# Patient Record
Sex: MALE | Race: WHITE | NOT HISPANIC OR LATINO
[De-identification: names, ages, dates, MRNs, and addresses within clinical notes are randomized per-mention and may not be internally consistent; named-entity substitution may affect disease eponyms.]

---

## 2017-02-01 ENCOUNTER — APPOINTMENT (OUTPATIENT)
Dept: CARDIOLOGY | Facility: CLINIC | Age: 67
End: 2017-02-01

## 2017-02-01 VITALS — SYSTOLIC BLOOD PRESSURE: 146 MMHG | DIASTOLIC BLOOD PRESSURE: 70 MMHG | HEART RATE: 61 BPM

## 2017-02-01 VITALS — WEIGHT: 232 LBS | HEIGHT: 78 IN | BODY MASS INDEX: 26.84 KG/M2

## 2017-06-20 ENCOUNTER — APPOINTMENT (OUTPATIENT)
Dept: CARDIOLOGY | Facility: CLINIC | Age: 67
End: 2017-06-20

## 2017-06-20 VITALS — BODY MASS INDEX: 26.84 KG/M2 | WEIGHT: 232 LBS | HEIGHT: 78 IN

## 2017-06-20 VITALS — DIASTOLIC BLOOD PRESSURE: 80 MMHG | SYSTOLIC BLOOD PRESSURE: 124 MMHG | HEART RATE: 83 BPM

## 2017-07-22 ENCOUNTER — APPOINTMENT (OUTPATIENT)
Dept: CARDIOLOGY | Facility: CLINIC | Age: 67
End: 2017-07-22

## 2017-08-15 ENCOUNTER — MEDICATION RENEWAL (OUTPATIENT)
Age: 67
End: 2017-08-15

## 2017-08-31 ENCOUNTER — TRANSCRIPTION ENCOUNTER (OUTPATIENT)
Age: 67
End: 2017-08-31

## 2017-10-03 ENCOUNTER — TRANSCRIPTION ENCOUNTER (OUTPATIENT)
Age: 67
End: 2017-10-03

## 2017-11-09 ENCOUNTER — APPOINTMENT (OUTPATIENT)
Dept: CARDIOLOGY | Facility: CLINIC | Age: 67
End: 2017-11-09

## 2017-11-09 VITALS — WEIGHT: 248 LBS | HEIGHT: 71 IN | BODY MASS INDEX: 34.72 KG/M2

## 2017-11-09 VITALS — SYSTOLIC BLOOD PRESSURE: 128 MMHG | DIASTOLIC BLOOD PRESSURE: 80 MMHG | HEART RATE: 79 BPM

## 2017-11-20 ENCOUNTER — APPOINTMENT (OUTPATIENT)
Dept: CARDIOLOGY | Facility: CLINIC | Age: 67
End: 2017-11-20

## 2018-01-29 ENCOUNTER — RX RENEWAL (OUTPATIENT)
Age: 68
End: 2018-01-29

## 2018-02-23 ENCOUNTER — RX RENEWAL (OUTPATIENT)
Age: 68
End: 2018-02-23

## 2018-03-20 ENCOUNTER — APPOINTMENT (OUTPATIENT)
Dept: CARDIOLOGY | Facility: CLINIC | Age: 68
End: 2018-03-20

## 2018-03-20 VITALS
HEIGHT: 71 IN | DIASTOLIC BLOOD PRESSURE: 56 MMHG | SYSTOLIC BLOOD PRESSURE: 130 MMHG | HEART RATE: 63 BPM | BODY MASS INDEX: 31.78 KG/M2 | WEIGHT: 227 LBS

## 2018-08-02 ENCOUNTER — APPOINTMENT (OUTPATIENT)
Dept: CARDIOLOGY | Facility: CLINIC | Age: 68
End: 2018-08-02

## 2018-08-02 VITALS
SYSTOLIC BLOOD PRESSURE: 126 MMHG | DIASTOLIC BLOOD PRESSURE: 80 MMHG | BODY MASS INDEX: 32.48 KG/M2 | HEART RATE: 59 BPM | WEIGHT: 232 LBS | HEIGHT: 71 IN

## 2018-08-29 ENCOUNTER — RX RENEWAL (OUTPATIENT)
Age: 68
End: 2018-08-29

## 2018-10-31 ENCOUNTER — RX RENEWAL (OUTPATIENT)
Age: 68
End: 2018-10-31

## 2018-11-27 ENCOUNTER — APPOINTMENT (OUTPATIENT)
Dept: CARDIOLOGY | Facility: CLINIC | Age: 68
End: 2018-11-27

## 2019-05-09 ENCOUNTER — APPOINTMENT (OUTPATIENT)
Dept: CARDIOLOGY | Facility: CLINIC | Age: 69
End: 2019-05-09
Payer: MEDICARE

## 2019-05-09 VITALS
BODY MASS INDEX: 34.44 KG/M2 | DIASTOLIC BLOOD PRESSURE: 86 MMHG | HEART RATE: 62 BPM | WEIGHT: 246 LBS | SYSTOLIC BLOOD PRESSURE: 174 MMHG | HEIGHT: 71 IN

## 2019-05-09 VITALS — SYSTOLIC BLOOD PRESSURE: 156 MMHG | DIASTOLIC BLOOD PRESSURE: 80 MMHG

## 2019-05-09 PROCEDURE — 93000 ELECTROCARDIOGRAM COMPLETE: CPT

## 2019-05-09 PROCEDURE — 99214 OFFICE O/P EST MOD 30 MIN: CPT

## 2019-05-09 NOTE — PHYSICAL EXAM
[General Appearance - Well Developed] : well developed [Normal Appearance] : normal appearance [Well Groomed] : well groomed [General Appearance - Well Nourished] : well nourished [Normal Conjunctiva] : the conjunctiva exhibited no abnormalities [No Deformities] : no deformities [Normal Oral Mucosa] : normal oral mucosa [Normal Oropharynx] : normal oropharynx [FreeTextEntry1] : No JVD [Respiration, Rhythm And Depth] : normal respiratory rhythm and effort [Heart Rate And Rhythm] : heart rate and rhythm were normal [Exaggerated Use Of Accessory Muscles For Inspiration] : no accessory muscle use [Bowel Sounds] : normal bowel sounds [Abdomen Tenderness] : non-tender [Heart Sounds] : normal S1 and S2 [Abdomen Mass (___ Cm)] : no abdominal mass palpated [Abnormal Walk] : normal gait [Nail Clubbing] : no clubbing of the fingernails [Cyanosis, Localized] : no localized cyanosis [Skin Color & Pigmentation] : normal skin color and pigmentation [Skin Turgor] : normal skin turgor [] : no rash [Impaired Insight] : insight and judgment were intact [Affect] : the affect was normal [Oriented To Time, Place, And Person] : oriented to person, place, and time [No Anxiety] : not feeling anxious

## 2019-05-09 NOTE — ASSESSMENT
[FreeTextEntry1] : The patient has continued to gain weight . His BP is increased in office but did decreased when able to relax in the office. The patient has had no chest pain . Home BP readings have beennormal to borderline.

## 2019-08-01 ENCOUNTER — APPOINTMENT (OUTPATIENT)
Dept: CARDIOLOGY | Facility: CLINIC | Age: 69
End: 2019-08-01
Payer: MEDICARE

## 2019-08-01 PROCEDURE — 93306 TTE W/DOPPLER COMPLETE: CPT

## 2019-08-04 ENCOUNTER — TRANSCRIPTION ENCOUNTER (OUTPATIENT)
Age: 69
End: 2019-08-04

## 2019-09-29 ENCOUNTER — RX RENEWAL (OUTPATIENT)
Age: 69
End: 2019-09-29

## 2019-12-08 ENCOUNTER — RX RENEWAL (OUTPATIENT)
Age: 69
End: 2019-12-08

## 2020-01-17 ENCOUNTER — APPOINTMENT (OUTPATIENT)
Dept: CARDIOLOGY | Facility: CLINIC | Age: 70
End: 2020-01-17
Payer: MEDICARE

## 2020-01-17 VITALS — HEART RATE: 80 BPM | BODY MASS INDEX: 36.4 KG/M2 | WEIGHT: 260 LBS | HEIGHT: 71 IN

## 2020-01-17 VITALS — SYSTOLIC BLOOD PRESSURE: 122 MMHG | DIASTOLIC BLOOD PRESSURE: 60 MMHG

## 2020-01-17 DIAGNOSIS — I35.1 NONRHEUMATIC AORTIC (VALVE) INSUFFICIENCY: ICD-10-CM

## 2020-01-17 PROCEDURE — 99214 OFFICE O/P EST MOD 30 MIN: CPT

## 2020-01-17 PROCEDURE — 93000 ELECTROCARDIOGRAM COMPLETE: CPT

## 2020-01-17 NOTE — REASON FOR VISIT
[Follow-Up - Clinic] : a clinic follow-up of [Atrial Fibrillation] : atrial fibrillation [Medication Management] : Medication management [Hypertension] : hypertension

## 2020-01-17 NOTE — ASSESSMENT
[FreeTextEntry1] : The patient has continued to gain weight . His BP is increased in office but did decreased when able to relax in the office. The patient has had no chest pain . Home BP readings have been normal to borderline. The patient conitnues to gain weight . He is now obese . He has a worsening first degree AV block and RBBB . BP is better controlled. His home BP does not correlate when checked in office .

## 2020-01-17 NOTE — PHYSICAL EXAM
[Well Groomed] : well groomed [General Appearance - Well Developed] : well developed [Normal Appearance] : normal appearance [Normal Conjunctiva] : the conjunctiva exhibited no abnormalities [General Appearance - Well Nourished] : well nourished [No Deformities] : no deformities [Normal Oropharynx] : normal oropharynx [Normal Oral Mucosa] : normal oral mucosa [Exaggerated Use Of Accessory Muscles For Inspiration] : no accessory muscle use [Respiration, Rhythm And Depth] : normal respiratory rhythm and effort [Bowel Sounds] : normal bowel sounds [Heart Sounds] : normal S1 and S2 [Heart Rate And Rhythm] : heart rate and rhythm were normal [Abnormal Walk] : normal gait [Abdomen Mass (___ Cm)] : no abdominal mass palpated [Abdomen Tenderness] : non-tender [Cyanosis, Localized] : no localized cyanosis [Nail Clubbing] : no clubbing of the fingernails [Skin Color & Pigmentation] : normal skin color and pigmentation [Oriented To Time, Place, And Person] : oriented to person, place, and time [] : no rash [Impaired Insight] : insight and judgment were intact [Skin Turgor] : normal skin turgor [Affect] : the affect was normal [No Anxiety] : not feeling anxious [FreeTextEntry1] : No JVD

## 2020-01-17 NOTE — HISTORY OF PRESENT ILLNESS
[FreeTextEntry1] : The patient has not had chest pain or shortness of breath or palpitations. No lightheadedness or dizziness. He has a worsening first degree AV block in the face of a RBBB .

## 2020-03-24 ENCOUNTER — RX RENEWAL (OUTPATIENT)
Age: 70
End: 2020-03-24

## 2020-06-05 ENCOUNTER — APPOINTMENT (OUTPATIENT)
Dept: CARDIOLOGY | Facility: CLINIC | Age: 70
End: 2020-06-05
Payer: MEDICARE

## 2020-06-05 ENCOUNTER — APPOINTMENT (OUTPATIENT)
Dept: CARDIOLOGY | Facility: CLINIC | Age: 70
End: 2020-06-05

## 2020-06-05 VITALS — DIASTOLIC BLOOD PRESSURE: 80 MMHG | SYSTOLIC BLOOD PRESSURE: 159 MMHG

## 2020-06-05 PROCEDURE — 99214 OFFICE O/P EST MOD 30 MIN: CPT | Mod: 95

## 2020-06-05 NOTE — PHYSICAL EXAM
[General Appearance - Well Developed] : well developed [Normal Appearance] : normal appearance [Well Groomed] : well groomed [General Appearance - Well Nourished] : well nourished [No Deformities] : no deformities [Normal Conjunctiva] : the conjunctiva exhibited no abnormalities [Normal Oral Mucosa] : normal oral mucosa [Normal Oropharynx] : normal oropharynx [Respiration, Rhythm And Depth] : normal respiratory rhythm and effort [Exaggerated Use Of Accessory Muscles For Inspiration] : no accessory muscle use [Heart Rate And Rhythm] : heart rate and rhythm were normal [Heart Sounds] : normal S1 and S2 [Abdomen Mass (___ Cm)] : no abdominal mass palpated [Abnormal Walk] : normal gait [Nail Clubbing] : no clubbing of the fingernails [Cyanosis, Localized] : no localized cyanosis [Skin Color & Pigmentation] : normal skin color and pigmentation [Skin Turgor] : normal skin turgor [] : no rash [Oriented To Time, Place, And Person] : oriented to person, place, and time [Impaired Insight] : insight and judgment were intact [Affect] : the affect was normal [No Anxiety] : not feeling anxious [FreeTextEntry1] : No JVD

## 2020-06-05 NOTE — HISTORY OF PRESENT ILLNESS
[Home] : at home, [unfilled] , at the time of the visit. [Medical Office: ([facility])___] : at the medical office located in  [Verbal consent obtained from patient] : the patient, [unfilled] [FreeTextEntry1] : The patient has not had lightheadedness or SOb or chest pain . The patient has changed jobs and there is no stress . His BP has been variable . The patient has not seen EP as of yet .

## 2020-06-05 NOTE — ASSESSMENT
[FreeTextEntry1] : The patient has had PAF . BP has been high normal at home . No HA or lightheadedness . The patient has had no CP or SOB . Still awaiting for EP contsult of marked first degrree AV block in the face of RBBB .

## 2020-06-25 ENCOUNTER — APPOINTMENT (OUTPATIENT)
Dept: CARDIOLOGY | Facility: CLINIC | Age: 70
End: 2020-06-25
Payer: MEDICARE

## 2020-06-25 ENCOUNTER — APPOINTMENT (OUTPATIENT)
Dept: CARDIOLOGY | Facility: CLINIC | Age: 70
End: 2020-06-25

## 2020-06-25 VITALS
SYSTOLIC BLOOD PRESSURE: 164 MMHG | WEIGHT: 262 LBS | HEIGHT: 72 IN | TEMPERATURE: 98.2 F | HEART RATE: 67 BPM | BODY MASS INDEX: 35.49 KG/M2 | DIASTOLIC BLOOD PRESSURE: 82 MMHG

## 2020-06-25 PROCEDURE — 99204 OFFICE O/P NEW MOD 45 MIN: CPT

## 2020-06-25 PROCEDURE — 93000 ELECTROCARDIOGRAM COMPLETE: CPT

## 2020-06-25 NOTE — PHYSICAL EXAM
[Normal Appearance] : normal appearance [General Appearance - Well Developed] : well developed [Well Groomed] : well groomed [General Appearance - Well Nourished] : well nourished [Normal Conjunctiva] : the conjunctiva exhibited no abnormalities [No Deformities] : no deformities [Normal Oral Mucosa] : normal oral mucosa [Normal Oropharynx] : normal oropharynx [Heart Sounds] : normal S1 and S2 [Heart Rate And Rhythm] : heart rate and rhythm were normal [Respiration, Rhythm And Depth] : normal respiratory rhythm and effort [Abdomen Mass (___ Cm)] : no abdominal mass palpated [Exaggerated Use Of Accessory Muscles For Inspiration] : no accessory muscle use [Abnormal Walk] : normal gait [Nail Clubbing] : no clubbing of the fingernails [Cyanosis, Localized] : no localized cyanosis [Skin Color & Pigmentation] : normal skin color and pigmentation [Skin Turgor] : normal skin turgor [Impaired Insight] : insight and judgment were intact [] : no rash [Oriented To Time, Place, And Person] : oriented to person, place, and time [Affect] : the affect was normal [No Anxiety] : not feeling anxious [FreeTextEntry1] : No JVD

## 2020-06-25 NOTE — HISTORY OF PRESENT ILLNESS
[Home] : at home, [unfilled] , at the time of the visit. [Medical Office: ([facility])___] : at the medical office located in  [Verbal consent obtained from patient] : the patient, [unfilled] [FreeTextEntry1] : The patient with hsotry of HTN, DM, and 1 dgree AV block with RBBB. Patient denies syncope or lightheadedness. However, patient has mildly asked and significant conduction disease. Patient was referred to further evaluation of his conduction disease. Patient also describes a possibility of having atrial fibrillation the past. However patient is not sure if he did or did not have atrial fibrillation.\par \par \par echo - normal EF, mil AS No

## 2020-06-25 NOTE — ASSESSMENT
[FreeTextEntry1] : COnduction disease\par RBBB with 1st degree\par \par - Recommend loop recorder implanted to further evaluation conduction disease. Loop recorder also be helpful to evaluate if patient truly has atrial fibrillation.\par \par I have explained the procedure to the patient.  I have explained the risks and benefits of the procedure to the patient.  There is approximately 1% chance of any major cardiovascular complication to occur. Complications include, but are not limited to infection or bleeding,  The patient understands the risk and would like to proceed with the procedure. Patient indicated that all of his questions were answered to his satisfaction and verbalized understanding.\par

## 2020-07-24 ENCOUNTER — OUTPATIENT (OUTPATIENT)
Dept: OUTPATIENT SERVICES | Facility: HOSPITAL | Age: 70
LOS: 1 days | Discharge: HOME | End: 2020-07-24

## 2020-07-24 DIAGNOSIS — Z11.59 ENCOUNTER FOR SCREENING FOR OTHER VIRAL DISEASES: ICD-10-CM

## 2020-07-27 ENCOUNTER — OUTPATIENT (OUTPATIENT)
Dept: OUTPATIENT SERVICES | Facility: HOSPITAL | Age: 70
LOS: 1 days | Discharge: HOME | End: 2020-07-27
Payer: MEDICARE

## 2020-07-27 DIAGNOSIS — I45.10 UNSPECIFIED RIGHT BUNDLE-BRANCH BLOCK: ICD-10-CM

## 2020-07-27 PROCEDURE — 33285 INSJ SUBQ CAR RHYTHM MNTR: CPT

## 2020-07-27 RX ORDER — CEPHALEXIN 500 MG
500 CAPSULE ORAL ONCE
Refills: 0 | Status: COMPLETED | OUTPATIENT
Start: 2020-07-27 | End: 2020-07-27

## 2020-07-27 RX ADMIN — Medication 500 MILLIGRAM(S): at 09:05

## 2020-07-27 NOTE — CHART NOTE - NSCHARTNOTEFT_GEN_A_CORE
Cardiac Electrophysiology Procedure Note    Procedure:  [employer]  Implantable Loop Recorder Implant    Indication: AF  Attending:	Rohan Couch MD    EQUIPMENT IMPLANTED      [employer] Linq  Serial Number  [id number]        DESCRIPTION OF PROCEDURE  The patient was brought to the Procedure Room in a nonsedated and fasting state, having received preoperative antibiotics. Informed, written consent was obtained prior to the procedure.  The left shoulder region was cleaned and prepped with serial applications of Chlorhexidine. Patient was then covered with sterile drapes in the usual manner. Blood pressure, oxygenation and level of comfort were stable throughout.   	The left parasternal region was defined; 10 cc of lidocaine solution were infiltrated into the skin overlying the left deltopectoral groove. A 5 mm. incision was then made. The loop recorder was injected under the skin..     The wound margins approximated well, without any tension or overlap. The wound was closed using dermabond  A dry, sterile dressing was placed over this.     COMPLICATIONS:  The patient tolerated the procedure well. There were no immediate complications.    CONCLUSIONS:  Successful implant of loop recorder.

## 2020-07-27 NOTE — PROGRESS NOTE ADULT - SUBJECTIVE AND OBJECTIVE BOX
Electrophysiology Brief Post-Op Note    I have personally seen and examined the patient.  I agree with the history and physical which I have reviewed and noted any changes below.  07-27-20 @ 08:31    PRE-OP DIAGNOSIS: AF    POST-OP DIAGNOSIS: AF    PROCEDURE: Loop Implnat    Physician: Khoa  Assistant: None    ESTIMATED BLOOD LOSS:  1     mL    ANESTHESIA TYPE:  [  ]General Anesthesia  [  ] Sedation  [X  ] Local/Regional    CONDITION  [  ] Critical  [  ] Serious  [  ]Fair  [ X ]Good      SPECIMENS REMOVED (IF APPLICABLE):  none    IMPLANTS (IF APPLICABLE)  Loop    FINDINGS  PLAN OF CARE  - FU 3-4 weeks

## 2020-07-27 NOTE — H&P ADULT - HISTORY OF PRESENT ILLNESS
The patient with history of HTN, DM, and 1 dgree AV block with RBBB. Patient was referred to further evaluation of his conduction disease. Patient describes a possibility of having atrial fibrillation the past. However patient is not sure if he did or did not have atrial fibrillation. Currently denies fever, chills, syncope, palpitations, chest pain, SOB, cough, abd pain, n/v/d/c.

## 2020-07-27 NOTE — H&P ADULT - ASSESSMENT
Cardiologist: Dr. Denney    This is a 69 y/o male with PMH of HTN, DM, and 1 degree AV block with RBBB who presents for ILR to r/o underlying AF.    Plan:  - Keflex 500 mg PO x 1 dose for ppx  - Home after procedure  - F/u in 4-6 weeks for postop check  - May remove bandaid tomorrow  - May shower starting tomorrow

## 2020-07-29 DIAGNOSIS — I10 ESSENTIAL (PRIMARY) HYPERTENSION: ICD-10-CM

## 2020-07-29 DIAGNOSIS — I44.0 ATRIOVENTRICULAR BLOCK, FIRST DEGREE: ICD-10-CM

## 2020-07-29 DIAGNOSIS — E11.9 TYPE 2 DIABETES MELLITUS WITHOUT COMPLICATIONS: ICD-10-CM

## 2020-08-31 ENCOUNTER — APPOINTMENT (OUTPATIENT)
Dept: CARDIOLOGY | Facility: CLINIC | Age: 70
End: 2020-08-31
Payer: MEDICARE

## 2020-08-31 PROCEDURE — G2066: CPT

## 2020-08-31 PROCEDURE — 93298 REM INTERROG DEV EVAL SCRMS: CPT

## 2020-09-15 ENCOUNTER — APPOINTMENT (OUTPATIENT)
Dept: CARDIOLOGY | Facility: CLINIC | Age: 70
End: 2020-09-15
Payer: MEDICARE

## 2020-09-15 VITALS
HEART RATE: 71 BPM | SYSTOLIC BLOOD PRESSURE: 129 MMHG | TEMPERATURE: 97.3 F | DIASTOLIC BLOOD PRESSURE: 73 MMHG | WEIGHT: 260 LBS | HEIGHT: 71 IN | BODY MASS INDEX: 36.4 KG/M2

## 2020-09-15 PROCEDURE — 93291 INTERROG DEV EVAL SCRMS IP: CPT

## 2020-09-15 PROCEDURE — 99213 OFFICE O/P EST LOW 20 MIN: CPT

## 2020-09-15 NOTE — HISTORY OF PRESENT ILLNESS
[FreeTextEntry1] : \par Cardiologist: Dr. Iverson\par \par Post ILR implant on July 27 for long term monitoring of likely  worsening first degree AV block in the face of a RBBB  \par \par Presents for loop interrogation and incision check\par \par Denies any sob, HOBBS, PND, orthopnea, no palpitations, no dizziness,lightheadedness, denies chest pains\par \par ECG ( 9/15/2020) 71bpm 1st degree AVB  ms  RBBB QRS 138ms, QTC 431ms\par ECHO (8/1/2019) EF normal  mild AS mild TR

## 2020-09-15 NOTE — PROCEDURE
[No] : not [de-identified] : [employer] LINQ [de-identified] : LINQ11 [de-identified] : 7/27/2020 [de-identified] : False reading of AF , SR with PAc, reprogrammed aggressive, less sensitive\par  [de-identified] : [id number]

## 2020-09-15 NOTE — REVIEW OF SYSTEMS
[Recent Weight Gain (___ Lbs)] : recent [unfilled] ~Ulb weight gain [Eyeglasses] : currently wearing eyeglasses [Joint Pain] : joint pain [Negative] : Heme/Lymph

## 2020-09-15 NOTE — PHYSICAL EXAM
[General Appearance - Well Developed] : well developed [General Appearance - Well Nourished] : well nourished [Well Groomed] : well groomed [Normal Appearance] : normal appearance [No Deformities] : no deformities [Heart Rate And Rhythm] : heart rate and rhythm were normal [Heart Sounds] : normal S1 and S2 [Respiration, Rhythm And Depth] : normal respiratory rhythm and effort [Exaggerated Use Of Accessory Muscles For Inspiration] : no accessory muscle use [Dry] : dry [Clean] : clean [Well-Healed] : well-healed [Bowel Sounds] : normal bowel sounds [Abdomen Tenderness] : non-tender [Abdomen Mass (___ Cm)] : no abdominal mass palpated [Normal Conjunctiva] : the conjunctiva exhibited no abnormalities [Cyanosis, Localized] : no localized cyanosis [Nail Clubbing] : no clubbing of the fingernails [Normal Oral Mucosa] : normal oral mucosa [Normal Oropharynx] : normal oropharynx [Skin Color & Pigmentation] : normal skin color and pigmentation [Abnormal Walk] : normal gait [] : no rash [Skin Turgor] : normal skin turgor [Oriented To Time, Place, And Person] : oriented to person, place, and time [Affect] : the affect was normal [Impaired Insight] : insight and judgment were intact [No Anxiety] : not feeling anxious [Erythema] : not erythematous [Tender] : not tender [FreeTextEntry1] : No JVD

## 2020-10-03 ENCOUNTER — OUTPATIENT (OUTPATIENT)
Dept: OUTPATIENT SERVICES | Facility: HOSPITAL | Age: 70
LOS: 1 days | Discharge: HOME | End: 2020-10-03

## 2020-10-03 DIAGNOSIS — Z11.59 ENCOUNTER FOR SCREENING FOR OTHER VIRAL DISEASES: ICD-10-CM

## 2020-10-06 ENCOUNTER — OUTPATIENT (OUTPATIENT)
Dept: OUTPATIENT SERVICES | Facility: HOSPITAL | Age: 70
LOS: 1 days | Discharge: HOME | End: 2020-10-06

## 2020-10-07 DIAGNOSIS — G47.33 OBSTRUCTIVE SLEEP APNEA (ADULT) (PEDIATRIC): ICD-10-CM

## 2020-10-19 ENCOUNTER — APPOINTMENT (OUTPATIENT)
Dept: CARDIOLOGY | Facility: CLINIC | Age: 70
End: 2020-10-19
Payer: MEDICARE

## 2020-10-19 PROCEDURE — G2066: CPT

## 2020-10-19 PROCEDURE — 93298 REM INTERROG DEV EVAL SCRMS: CPT

## 2020-11-23 ENCOUNTER — APPOINTMENT (OUTPATIENT)
Dept: CARDIOLOGY | Facility: CLINIC | Age: 70
End: 2020-11-23
Payer: MEDICARE

## 2020-11-23 ENCOUNTER — NON-APPOINTMENT (OUTPATIENT)
Age: 70
End: 2020-11-23

## 2020-11-23 PROCEDURE — G2066: CPT

## 2020-11-23 PROCEDURE — 93298 REM INTERROG DEV EVAL SCRMS: CPT

## 2020-12-28 ENCOUNTER — APPOINTMENT (OUTPATIENT)
Dept: CARDIOLOGY | Facility: CLINIC | Age: 70
End: 2020-12-28
Payer: MEDICARE

## 2020-12-28 PROCEDURE — 93298 REM INTERROG DEV EVAL SCRMS: CPT

## 2020-12-28 PROCEDURE — G2066: CPT

## 2021-01-25 ENCOUNTER — NON-APPOINTMENT (OUTPATIENT)
Age: 71
End: 2021-01-25

## 2021-02-02 ENCOUNTER — APPOINTMENT (OUTPATIENT)
Dept: CARDIOLOGY | Facility: CLINIC | Age: 71
End: 2021-02-02
Payer: MEDICARE

## 2021-02-02 PROCEDURE — 93298 REM INTERROG DEV EVAL SCRMS: CPT

## 2021-02-02 PROCEDURE — G2066: CPT

## 2021-02-16 ENCOUNTER — NON-APPOINTMENT (OUTPATIENT)
Age: 71
End: 2021-02-16

## 2021-03-09 ENCOUNTER — NON-APPOINTMENT (OUTPATIENT)
Age: 71
End: 2021-03-09

## 2021-03-09 ENCOUNTER — APPOINTMENT (OUTPATIENT)
Dept: CARDIOLOGY | Facility: CLINIC | Age: 71
End: 2021-03-09
Payer: MEDICARE

## 2021-03-09 PROCEDURE — G2066: CPT

## 2021-03-09 PROCEDURE — 93298 REM INTERROG DEV EVAL SCRMS: CPT

## 2021-04-13 ENCOUNTER — NON-APPOINTMENT (OUTPATIENT)
Age: 71
End: 2021-04-13

## 2021-04-13 ENCOUNTER — APPOINTMENT (OUTPATIENT)
Dept: CARDIOLOGY | Facility: CLINIC | Age: 71
End: 2021-04-13
Payer: MEDICARE

## 2021-04-13 PROCEDURE — G2066: CPT

## 2021-04-13 PROCEDURE — 93298 REM INTERROG DEV EVAL SCRMS: CPT

## 2021-05-01 ENCOUNTER — RX RENEWAL (OUTPATIENT)
Age: 71
End: 2021-05-01

## 2021-06-02 ENCOUNTER — APPOINTMENT (OUTPATIENT)
Dept: CARDIOLOGY | Facility: CLINIC | Age: 71
End: 2021-06-02
Payer: MEDICARE

## 2021-06-02 VITALS
DIASTOLIC BLOOD PRESSURE: 81 MMHG | HEIGHT: 71 IN | BODY MASS INDEX: 35.14 KG/M2 | TEMPERATURE: 98.2 F | HEART RATE: 67 BPM | RESPIRATION RATE: 16 BRPM | SYSTOLIC BLOOD PRESSURE: 154 MMHG | OXYGEN SATURATION: 96 % | WEIGHT: 251 LBS

## 2021-06-02 PROCEDURE — 93291 INTERROG DEV EVAL SCRMS IP: CPT

## 2021-06-02 PROCEDURE — 93000 ELECTROCARDIOGRAM COMPLETE: CPT | Mod: 59

## 2021-06-02 PROCEDURE — 99213 OFFICE O/P EST LOW 20 MIN: CPT

## 2021-06-02 RX ORDER — LOSARTAN POTASSIUM 100 MG/1
100 TABLET, FILM COATED ORAL DAILY
Qty: 90 | Refills: 3 | Status: DISCONTINUED | COMMUNITY
Start: 2017-02-01 | End: 2021-06-02

## 2021-06-02 NOTE — REASON FOR VISIT
[Follow-up Device Check] : is here today for a follow-up device check visit for [Arrhythmias (seen on stored data)] : arrhythmias seen on stored data [Follow-Up - Clinic] : a clinic follow-up of [Atrial Fibrillation] : atrial fibrillation [Hypertension] : hypertension [Medication Management] : Medication management

## 2021-06-02 NOTE — PHYSICAL EXAM
[General Appearance - Well Developed] : well developed [Normal Appearance] : normal appearance [Well Groomed] : well groomed [General Appearance - Well Nourished] : well nourished [No Deformities] : no deformities [Heart Rate And Rhythm] : heart rate and rhythm were normal [Heart Sounds] : normal S1 and S2 [Respiration, Rhythm And Depth] : normal respiratory rhythm and effort [Exaggerated Use Of Accessory Muscles For Inspiration] : no accessory muscle use [Well-Healed] : well-healed [Abdomen Soft] : soft [Abdomen Mass (___ Cm)] : no abdominal mass palpated [Nail Clubbing] : no clubbing of the fingernails [Cyanosis, Localized] : no localized cyanosis [Normal Conjunctiva] : the conjunctiva exhibited no abnormalities [Abnormal Walk] : normal gait [Skin Color & Pigmentation] : normal skin color and pigmentation [Skin Turgor] : normal skin turgor [] : no rash [Oriented To Time, Place, And Person] : oriented to person, place, and time [Impaired Insight] : insight and judgment were intact [Affect] : the affect was normal [No Anxiety] : not feeling anxious [FreeTextEntry1] : No JVD

## 2021-06-02 NOTE — ASSESSMENT
[FreeTextEntry1] : ILR interrogation\par - Many noctural pippa episodes consistent with history for which he is asymptomatic.\par - Many false AF detections possibly due to lengthened SD interval \par \par ILR AF detection changed from less to least sensitive to reduce false AF detections. Pippa duration changed from 4 to 8 beats to reduce amount of pippa data as this is known to patient's history.\par \par - Patient aware of conduction disease. He reports feeling well. He does report history of sleep apnea but states he does not want to wear CPAP machine. I did tell him that sleep apnea could be contributing to nocturnal bradycardia. I also informed patient that if conduction disease worsens there is a possibility that he may need PPM. At this time, patient is adamant to not have PPM. \par \par Plan:\par - Continue with remote monitoring\par - Continue with current medications\par - Follow up in office in 9 months

## 2021-06-02 NOTE — PROCEDURE
[See Scanned Paceart Report] : See scanned paceart report [See Device Printout] : See device printout [de-identified] : EVA [de-identified] : LNQ11 [de-identified] : [id number] [de-identified] : 7/27/2020 [de-identified] : Good [de-identified] : Many episodes of nocturnal bradycardia\par 1 false pause due to undersensing\par Multiple episodes of AF, longest episode 1 hour. EGM inconsistent with AF. Multiple episodes with EGM\par \par AF detection changed to least sensitive. Jens detection changed from 4 to 8 beats.

## 2021-06-02 NOTE — HISTORY OF PRESENT ILLNESS
[FreeTextEntry1] : The patient has history of HTN, DM, and 1 degree AV block with RBBB. Patient denies syncope or lightheadedness. However, patient has significant conduction disease. Patient was referred for further evaluation of his conduction disease. Patient also describes a possibility of having atrial fibrillation the past. However patient is not sure if he did or did not have atrial fibrillation.\par \par

## 2021-07-13 ENCOUNTER — NON-APPOINTMENT (OUTPATIENT)
Age: 71
End: 2021-07-13

## 2021-07-13 ENCOUNTER — APPOINTMENT (OUTPATIENT)
Dept: CARDIOLOGY | Facility: CLINIC | Age: 71
End: 2021-07-13
Payer: MEDICARE

## 2021-07-13 PROCEDURE — 93298 REM INTERROG DEV EVAL SCRMS: CPT

## 2021-07-13 PROCEDURE — G2066: CPT

## 2021-08-17 ENCOUNTER — APPOINTMENT (OUTPATIENT)
Dept: CARDIOLOGY | Facility: CLINIC | Age: 71
End: 2021-08-17
Payer: MEDICARE

## 2021-08-17 ENCOUNTER — NON-APPOINTMENT (OUTPATIENT)
Age: 71
End: 2021-08-17

## 2021-08-17 PROCEDURE — G2066: CPT

## 2021-08-17 PROCEDURE — 93298 REM INTERROG DEV EVAL SCRMS: CPT

## 2021-09-02 ENCOUNTER — NON-APPOINTMENT (OUTPATIENT)
Age: 71
End: 2021-09-02

## 2021-09-22 ENCOUNTER — NON-APPOINTMENT (OUTPATIENT)
Age: 71
End: 2021-09-22

## 2021-09-22 ENCOUNTER — APPOINTMENT (OUTPATIENT)
Dept: CARDIOLOGY | Facility: CLINIC | Age: 71
End: 2021-09-22
Payer: MEDICARE

## 2021-09-22 PROCEDURE — G2066: CPT

## 2021-09-22 PROCEDURE — 93298 REM INTERROG DEV EVAL SCRMS: CPT

## 2021-10-26 ENCOUNTER — APPOINTMENT (OUTPATIENT)
Dept: CARDIOLOGY | Facility: CLINIC | Age: 71
End: 2021-10-26
Payer: MEDICARE

## 2021-10-26 ENCOUNTER — NON-APPOINTMENT (OUTPATIENT)
Age: 71
End: 2021-10-26

## 2021-10-26 PROCEDURE — 93298 REM INTERROG DEV EVAL SCRMS: CPT | Mod: NC

## 2021-10-26 PROCEDURE — G2066: CPT | Mod: NC

## 2021-11-30 ENCOUNTER — APPOINTMENT (OUTPATIENT)
Dept: CARDIOLOGY | Facility: CLINIC | Age: 71
End: 2021-11-30
Payer: MEDICARE

## 2021-11-30 ENCOUNTER — NON-APPOINTMENT (OUTPATIENT)
Age: 71
End: 2021-11-30

## 2021-11-30 PROCEDURE — 93298 REM INTERROG DEV EVAL SCRMS: CPT

## 2021-11-30 PROCEDURE — G2066: CPT

## 2022-01-05 ENCOUNTER — NON-APPOINTMENT (OUTPATIENT)
Age: 72
End: 2022-01-05

## 2022-01-05 ENCOUNTER — APPOINTMENT (OUTPATIENT)
Dept: CARDIOLOGY | Facility: CLINIC | Age: 72
End: 2022-01-05
Payer: MEDICARE

## 2022-01-05 PROCEDURE — G2066: CPT

## 2022-01-05 PROCEDURE — 93298 REM INTERROG DEV EVAL SCRMS: CPT

## 2022-01-28 ENCOUNTER — APPOINTMENT (OUTPATIENT)
Dept: CARDIOLOGY | Facility: CLINIC | Age: 72
End: 2022-01-28
Payer: MEDICARE

## 2022-01-28 VITALS
TEMPERATURE: 97 F | DIASTOLIC BLOOD PRESSURE: 75 MMHG | HEIGHT: 71 IN | WEIGHT: 269 LBS | HEART RATE: 66 BPM | BODY MASS INDEX: 37.66 KG/M2 | SYSTOLIC BLOOD PRESSURE: 127 MMHG

## 2022-01-28 PROCEDURE — 93000 ELECTROCARDIOGRAM COMPLETE: CPT | Mod: 59

## 2022-01-28 PROCEDURE — 99212 OFFICE O/P EST SF 10 MIN: CPT

## 2022-01-28 RX ORDER — LOSARTAN POTASSIUM 100 MG/1
100 TABLET, FILM COATED ORAL DAILY
Refills: 0 | Status: COMPLETED | COMMUNITY
End: 2022-01-28

## 2022-01-28 NOTE — HISTORY OF PRESENT ILLNESS
[FreeTextEntry1] : \par Cardiologist: Dr. Iverson\par \par Post ILR implant on July 27 for long term monitoring of likely  worsening first degree AV block in the face of a RBBB  . He has sleep apnea but doesn't wear CPAP. admits he snores heavily.\par \par Presents for loop interrogation\par \par Denies any sob, HOBBS, PND, orthopnea, no palpitations, no dizziness,lightheadedness, denies chest pains\par ECG ( 1/28/2022) 66 bpm 1st degree AVB  ms  RBBB QRS 138ms \par ECG ( 9/15/2020) 71bpm 1st degree AVB  ms  RBBB QRS 138ms, QTC 431ms\par ECHO (8/1/2019) EF normal  mild AS mild TR

## 2022-01-28 NOTE — PHYSICAL EXAM
[General Appearance - Well Developed] : well developed [Normal Appearance] : normal appearance [General Appearance - In No Acute Distress] : no acute distress [Heart Rate And Rhythm] : heart rate and rhythm were normal [Murmurs] : no murmurs present [Edema] : no peripheral edema present [] : no respiratory distress [Respiration, Rhythm And Depth] : normal respiratory rhythm and effort [Well-Healed] : well-healed [Abdomen Soft] : soft [FreeTextEntry1] : left para sternal

## 2022-01-28 NOTE — ASSESSMENT
[FreeTextEntry1] : 71 years old male with PMH of HTN, DM, afib ( not on ac) referred to EP for evaluation of conduction disease.\par \par Progression of conduction disease - Patient denies any symptoms\par s/p ILR implant for long term monitoring of arrhythmias on 8/7/2020\par Device interrogation showed second degree, mobitz type 1 heart block, pauses, afib.\par episodes are progressive and patient is getting calls from remote and NPs . This is the first f/up after his wound check. as he is  no show on his appointments.\par Today, he showed up - to let us know he wants the loop out.\par He proclaimed "he is not going to wear a cpap, he is not going to have a pacemaker either and he no longer wants to be monitored". " He understands the risks and benefits. I discussed this with Dr. Couch.\par \par Loop explant- aware of the risks and benefits. Aware it's an out patient procedure \par Our  will call patient of the appointment, his schedule to do BW and covid test.\par \par \par \par \par \par \par

## 2022-01-28 NOTE — PROCEDURE
[No] : not [Sensing Amplitude ___mv] : sensing amplitude was [unfilled] mv [de-identified] : [employer] LINQ [de-identified] : LINQ11 [de-identified] : [id number] [de-identified] : 7/27/2020 [de-identified] : good [de-identified] :  , SR with PAc, \par multiple pauses - nocturnal some undersense\par ? AFIB

## 2022-02-09 ENCOUNTER — NON-APPOINTMENT (OUTPATIENT)
Age: 72
End: 2022-02-09

## 2022-02-09 ENCOUNTER — APPOINTMENT (OUTPATIENT)
Dept: CARDIOLOGY | Facility: CLINIC | Age: 72
End: 2022-02-09
Payer: MEDICARE

## 2022-02-09 PROCEDURE — 93298 REM INTERROG DEV EVAL SCRMS: CPT

## 2022-02-09 PROCEDURE — G2066: CPT

## 2022-02-22 ENCOUNTER — OUTPATIENT (OUTPATIENT)
Dept: OUTPATIENT SERVICES | Facility: HOSPITAL | Age: 72
LOS: 1 days | Discharge: HOME | End: 2022-02-22
Payer: MEDICARE

## 2022-02-22 DIAGNOSIS — R00.2 PALPITATIONS: ICD-10-CM

## 2022-02-22 DIAGNOSIS — Z90.49 ACQUIRED ABSENCE OF OTHER SPECIFIED PARTS OF DIGESTIVE TRACT: Chronic | ICD-10-CM

## 2022-02-22 PROCEDURE — 33286 RMVL SUBQ CAR RHYTHM MNTR: CPT

## 2022-02-22 RX ORDER — CEPHALEXIN 500 MG
500 CAPSULE ORAL ONCE
Refills: 0 | Status: COMPLETED | OUTPATIENT
Start: 2022-02-22 | End: 2022-02-22

## 2022-02-22 RX ADMIN — Medication 500 MILLIGRAM(S): at 10:10

## 2022-02-22 NOTE — H&P ADULT - NSICDXPASTMEDICALHX_GEN_ALL_CORE_FT
PAST MEDICAL HISTORY:  Aortic stenosis     Atrial fibrillation and flutter     AV block, Mobitz 1     GERD (gastroesophageal reflux disease)     H/O: HTN (hypertension)     History of morbid obesity     DEONNA (obstructive sleep apnea)     RBBB

## 2022-02-22 NOTE — H&P ADULT - NSHPPHYSICALEXAM_GEN_ALL_CORE
Constitutional: well developed, normal appearance and no acute distress.   Cardiovascular: heart rate and rhythm were normal, no murmurs present and no peripheral edema present.   Pulmonary: no respiratory distress and normal respiratory rhythm and effort.   Skin: The surgical incision was located on the left para sternal. The incision was well-healed.   Abdomen: soft. obese, bs

## 2022-02-22 NOTE — PROGRESS NOTE ADULT - SUBJECTIVE AND OBJECTIVE BOX
Electrophysiology Brief Post-Op Note    I have personally seen and examined the patient.  I agree with the history and physical which I have reviewed and noted any changes below.  02-22-22 @ 10:00    PRE-OP DIAGNOSIS: AVB    POST-OP DIAGNOSIS: AVB    PROCEDURE: Loop Explant    Physician: Dr. Couch  Assistant: STEPHANIE Andrews    ESTIMATED BLOOD LOSS:  1     mL    ANESTHESIA TYPE:  [  ]General Anesthesia  [  ] Sedation  [X  ] Local/Regional    CONDITION  [  ] Critical  [  ] Serious  [  ]Fair  [ X ]Good    SPECIMENS REMOVED (IF APPLICABLE):  Loop    IMPLANTS (IF APPLICABLE)  None    FINDINGS  PLAN OF CARE  - FU 4-6 weeks  - May shower in 48 hours  - Remove band aid in 2 days

## 2022-02-22 NOTE — H&P ADULT - HISTORY OF PRESENT ILLNESS
70yo Male with h/o DEONNA not on CPAP, AFib not on AC, [place] 1 AVB, RBBB, s/p ILR 7/2020 placement for worsening AVB presents for elective explant of the device  On device interrogation patient noted progression of conduction disease however patient does not to be monitor any more. Patient denies any complaints     Procedure  Device Check The patient is not pacemaker dependent.   Pacemaker/ICD : [employer] LINQ.   Model: LINQ11. Serial Number: [id number]. Date of Implant: 7/27/2020.   Battery Status: good.   Rt Ventricle:. sensing amplitude was 0.8 mv.   Comments:. , SR with PAc,   multiple pauses - nocturnal some undersense  ? AFIB

## 2022-02-22 NOTE — CHART NOTE - NSCHARTNOTEFT_GEN_A_CORE
Cardiac Electrophysiology Procedure Note    Procedure: Medtronic  Implantable Loop Recorder Explant    Indication: AVB    Attending: Dr. Couch	  Assisting: NP Gatdula    EQUIPMENT EXPLANTED   :   Medtronic Linq  Serial Number  : [id number]    DESCRIPTION OF PROCEDURE  The patient was brought to the Procedure Room in a nonsedated and fasting state, having received preoperative antibiotics. Informed, written consent was obtained prior to the procedure.  The left shoulder region was cleaned and prepped with serial applications of Chlorhexidine. Patient was then covered with sterile drapes in the usual manner. Blood pressure, oxygenation and level of comfort were stable throughout.     	The left parasternal region was defined; 10 cc of lidocaine solution were infiltrated into the skin overlying the left deltopectoral groove. A 5 mm. incision was then made. The loop recorder was removed from under the skin.  The wound margins approximated well, without any tension or overlap. The wound was closed using dermabond. A dry, sterile dressing was placed over this.     COMPLICATIONS:  The patient tolerated the procedure well. There were no immediate complications.    CONCLUSIONS:  Successful explant of loop recorder. Cardiac Electrophysiology Procedure Note    Procedure: Medtronic  Implantable Loop Recorder Explant    Indication: AVB    Attending: Dr. Couch	  Assisting: NP Gatdula    EQUIPMENT EXPLANTED   :   Medtronic Linq  Serial Number  : [id number]    DESCRIPTION OF PROCEDURE  The patient was brought to the Procedure Room in a nonsedated and fasting state, having received preoperative antibiotics. Informed, written consent was obtained prior to the procedure.  The left shoulder region was cleaned and prepped with serial applications of Chlorhexidine. Patient was then covered with sterile drapes in the usual manner. Blood pressure, oxygenation and level of comfort were stable throughout.     	The left parasternal region was defined; 10 cc of lidocaine solution were infiltrated into the skin overlying the left deltopectoral groove. A 5 mm. incision was then made. The loop recorder was removed from under the skin.  The wound margins approximated well, without any tension or overlap. The wound was closed using dermabond. A dry, sterile dressing was placed over this.     COMPLICATIONS:  The patient tolerated the procedure well. There were no immediate complications.    CONCLUSIONS:  Successful explant of loop recorder.    Attending Attestation  I was physically present for the key portion of the evaluation and management service provide.

## 2022-02-22 NOTE — H&P ADULT - ASSESSMENT
71yMale with h/o AF, AV block, s/p ILR 7/2020    presents for elective explantation of loop recorder    Plan:  explant the device  Kelfex 500mg x1 periop  observe post procedure  discharge home when recovers  Remove dressing in 2 days  Do not wet site for 48hrs

## 2022-02-28 DIAGNOSIS — Z91.012 ALLERGY TO EGGS: ICD-10-CM

## 2022-02-28 DIAGNOSIS — I48.92 UNSPECIFIED ATRIAL FLUTTER: ICD-10-CM

## 2022-02-28 DIAGNOSIS — Z87.891 PERSONAL HISTORY OF NICOTINE DEPENDENCE: ICD-10-CM

## 2022-02-28 DIAGNOSIS — Z45.09 ENCOUNTER FOR ADJUSTMENT AND MANAGEMENT OF OTHER CARDIAC DEVICE: ICD-10-CM

## 2022-02-28 DIAGNOSIS — Z86.79 PERSONAL HISTORY OF OTHER DISEASES OF THE CIRCULATORY SYSTEM: ICD-10-CM

## 2022-02-28 DIAGNOSIS — Z91.011 ALLERGY TO MILK PRODUCTS: ICD-10-CM

## 2022-02-28 DIAGNOSIS — G47.33 OBSTRUCTIVE SLEEP APNEA (ADULT) (PEDIATRIC): ICD-10-CM

## 2022-02-28 DIAGNOSIS — Z79.84 LONG TERM (CURRENT) USE OF ORAL HYPOGLYCEMIC DRUGS: ICD-10-CM

## 2022-02-28 DIAGNOSIS — I44.0 ATRIOVENTRICULAR BLOCK, FIRST DEGREE: ICD-10-CM

## 2022-02-28 DIAGNOSIS — Z79.82 LONG TERM (CURRENT) USE OF ASPIRIN: ICD-10-CM

## 2022-03-16 ENCOUNTER — FORM ENCOUNTER (OUTPATIENT)
Age: 72
End: 2022-03-16

## 2022-03-16 ENCOUNTER — APPOINTMENT (OUTPATIENT)
Dept: CARDIOLOGY | Facility: CLINIC | Age: 72
End: 2022-03-16

## 2022-03-17 PROBLEM — Z87.898 PERSONAL HISTORY OF OTHER SPECIFIED CONDITIONS: Chronic | Status: ACTIVE | Noted: 2022-02-22

## 2022-03-17 PROBLEM — I44.1 ATRIOVENTRICULAR BLOCK, SECOND DEGREE: Chronic | Status: ACTIVE | Noted: 2022-02-22

## 2022-03-17 PROBLEM — K21.9 GASTRO-ESOPHAGEAL REFLUX DISEASE WITHOUT ESOPHAGITIS: Chronic | Status: ACTIVE | Noted: 2022-02-22

## 2022-03-17 PROBLEM — I35.0 NONRHEUMATIC AORTIC (VALVE) STENOSIS: Chronic | Status: ACTIVE | Noted: 2022-02-22

## 2022-03-17 PROBLEM — G47.33 OBSTRUCTIVE SLEEP APNEA (ADULT) (PEDIATRIC): Chronic | Status: ACTIVE | Noted: 2022-02-22

## 2022-03-17 PROBLEM — Z86.79 PERSONAL HISTORY OF OTHER DISEASES OF THE CIRCULATORY SYSTEM: Chronic | Status: ACTIVE | Noted: 2022-02-22

## 2022-03-17 PROBLEM — I45.10 UNSPECIFIED RIGHT BUNDLE-BRANCH BLOCK: Chronic | Status: ACTIVE | Noted: 2022-02-22

## 2022-04-08 ENCOUNTER — APPOINTMENT (OUTPATIENT)
Dept: CARDIOLOGY | Facility: CLINIC | Age: 72
End: 2022-04-08
Payer: MEDICARE

## 2022-04-08 VITALS
BODY MASS INDEX: 37.8 KG/M2 | WEIGHT: 270 LBS | DIASTOLIC BLOOD PRESSURE: 81 MMHG | HEIGHT: 71 IN | TEMPERATURE: 98 F | RESPIRATION RATE: 16 BRPM | SYSTOLIC BLOOD PRESSURE: 149 MMHG | HEART RATE: 71 BPM

## 2022-04-08 PROCEDURE — 99214 OFFICE O/P EST MOD 30 MIN: CPT

## 2022-04-08 PROCEDURE — 93000 ELECTROCARDIOGRAM COMPLETE: CPT

## 2022-04-08 RX ORDER — DOXAZOSIN 2 MG/1
2 TABLET ORAL DAILY
Refills: 0 | Status: ACTIVE | COMMUNITY

## 2022-04-08 NOTE — ASSESSMENT
[FreeTextEntry1] : light headiness\par - not related to arrhythmias\par - no signs of AF\par - encourage pt to use the mask\par - FU as needed\par \par

## 2022-04-08 NOTE — HISTORY OF PRESENT ILLNESS
[FreeTextEntry1] : The patient with hsotry of HTN, DM, and 1 dgree AV block with RBBB. Patient denies syncope or lightheadedness. However, patient has mildly asked and significant conduction disease. Patient was referred to further evaluation of his conduction disease. Patient also describes a possibility of having atrial fibrillation the past. However patient is not sure if he did or did not have atrial fibrillation. Patietn has DEONNA but does not want to use the mask\par \par No symptoms, no syncope or light headiness. s/p loop expalnt\par \par \par EKG SR 81 bpm 1 degree

## 2022-04-20 ENCOUNTER — APPOINTMENT (OUTPATIENT)
Dept: CARDIOLOGY | Facility: CLINIC | Age: 72
End: 2022-04-20

## 2022-04-20 ENCOUNTER — FORM ENCOUNTER (OUTPATIENT)
Age: 72
End: 2022-04-20

## 2022-05-19 ENCOUNTER — FORM ENCOUNTER (OUTPATIENT)
Age: 72
End: 2022-05-19

## 2022-05-19 ENCOUNTER — APPOINTMENT (OUTPATIENT)
Dept: CARDIOLOGY | Facility: CLINIC | Age: 72
End: 2022-05-19

## 2022-06-01 ENCOUNTER — APPOINTMENT (OUTPATIENT)
Dept: CARDIOLOGY | Facility: CLINIC | Age: 72
End: 2022-06-01
Payer: MEDICARE

## 2022-06-01 VITALS
BODY MASS INDEX: 34.16 KG/M2 | HEIGHT: 71 IN | WEIGHT: 244 LBS | SYSTOLIC BLOOD PRESSURE: 130 MMHG | DIASTOLIC BLOOD PRESSURE: 76 MMHG | HEART RATE: 78 BPM

## 2022-06-01 PROCEDURE — 93000 ELECTROCARDIOGRAM COMPLETE: CPT

## 2022-06-01 PROCEDURE — 99214 OFFICE O/P EST MOD 30 MIN: CPT | Mod: 25

## 2022-06-01 NOTE — HISTORY OF PRESENT ILLNESS
[FreeTextEntry1] : The patient has not been seen in over 2 years . Seen by Dr. Couch and had his ILM removed . There was no AF . there is a markded first degree AV block on his ECG . He has had no lightheadedness or dizziness . No chest pain or SOB

## 2022-06-01 NOTE — ASSESSMENT
[FreeTextEntry1] : The patient has been feeling well . Has paresthesias in his feet . No chest pain or lightheadedness. Had ILM and during that time he has had no AF or AFL .his previous history fo this was when he was sick in the hosptial He has a persistent sinus rhythm with marked first degree AV block. He is not on AVN blocking medications and most likely has some degree of AVN dysfunction  He had mild AS on last echo and on exam

## 2022-06-01 NOTE — CARDIOLOGY SUMMARY
[___] : [unfilled] [LVEF ___%] : LVEF [unfilled]% [None] : normal LV function [de-identified] : 6-1-2022 NSR with marked first degree AV block PAC RBBB

## 2022-06-04 LAB
ALBUMIN SERPL ELPH-MCNC: 4.7 G/DL
ALP BLD-CCNC: 97 U/L
ALT SERPL-CCNC: 10 U/L
ANION GAP SERPL CALC-SCNC: 14 MMOL/L
AST SERPL-CCNC: 12 U/L
BASOPHILS # BLD AUTO: 0.06 K/UL
BASOPHILS NFR BLD AUTO: 0.5 %
BILIRUB SERPL-MCNC: 0.7 MG/DL
BUN SERPL-MCNC: 21 MG/DL
CALCIUM SERPL-MCNC: 10.1 MG/DL
CHLORIDE SERPL-SCNC: 101 MMOL/L
CHOLEST SERPL-MCNC: 126 MG/DL
CO2 SERPL-SCNC: 25 MMOL/L
CREAT SERPL-MCNC: 0.9 MG/DL
EGFR: 92 ML/MIN/1.73M2
EOSINOPHIL # BLD AUTO: 0.49 K/UL
EOSINOPHIL NFR BLD AUTO: 4 %
ESTIMATED AVERAGE GLUCOSE: 114 MG/DL
GLUCOSE SERPL-MCNC: 77 MG/DL
HBA1C MFR BLD HPLC: 5.6 %
HCT VFR BLD CALC: 46.4 %
HDLC SERPL-MCNC: 59 MG/DL
HGB BLD-MCNC: 14.7 G/DL
IMM GRANULOCYTES NFR BLD AUTO: 0.4 %
LDLC SERPL CALC-MCNC: 52 MG/DL
LYMPHOCYTES # BLD AUTO: 2.9 K/UL
LYMPHOCYTES NFR BLD AUTO: 23.4 %
MAN DIFF?: NORMAL
MCHC RBC-ENTMCNC: 29.2 PG
MCHC RBC-ENTMCNC: 31.7 G/DL
MCV RBC AUTO: 92.1 FL
MONOCYTES # BLD AUTO: 0.85 K/UL
MONOCYTES NFR BLD AUTO: 6.9 %
NEUTROPHILS # BLD AUTO: 8.02 K/UL
NEUTROPHILS NFR BLD AUTO: 64.8 %
NONHDLC SERPL-MCNC: 67 MG/DL
PLATELET # BLD AUTO: 260 K/UL
POTASSIUM SERPL-SCNC: 4.6 MMOL/L
PROT SERPL-MCNC: 7.4 G/DL
RBC # BLD: 5.04 M/UL
RBC # FLD: 13.9 %
SODIUM SERPL-SCNC: 140 MMOL/L
TRIGL SERPL-MCNC: 77 MG/DL
WBC # FLD AUTO: 12.37 K/UL

## 2022-06-16 ENCOUNTER — APPOINTMENT (OUTPATIENT)
Dept: CARDIOLOGY | Facility: CLINIC | Age: 72
End: 2022-06-16

## 2022-11-11 ENCOUNTER — APPOINTMENT (OUTPATIENT)
Dept: CARDIOLOGY | Facility: CLINIC | Age: 72
End: 2022-11-11

## 2022-11-11 PROCEDURE — 93306 TTE W/DOPPLER COMPLETE: CPT

## 2022-11-14 ENCOUNTER — EMERGENCY (EMERGENCY)
Facility: HOSPITAL | Age: 72
LOS: 0 days | Discharge: HOME | End: 2022-11-14
Attending: EMERGENCY MEDICINE | Admitting: EMERGENCY MEDICINE

## 2022-11-14 VITALS
TEMPERATURE: 98 F | OXYGEN SATURATION: 98 % | HEART RATE: 68 BPM | DIASTOLIC BLOOD PRESSURE: 68 MMHG | RESPIRATION RATE: 18 BRPM | SYSTOLIC BLOOD PRESSURE: 155 MMHG

## 2022-11-14 DIAGNOSIS — Z76.0 ENCOUNTER FOR ISSUE OF REPEAT PRESCRIPTION: ICD-10-CM

## 2022-11-14 DIAGNOSIS — E11.9 TYPE 2 DIABETES MELLITUS WITHOUT COMPLICATIONS: ICD-10-CM

## 2022-11-14 DIAGNOSIS — Z86.79 PERSONAL HISTORY OF OTHER DISEASES OF THE CIRCULATORY SYSTEM: ICD-10-CM

## 2022-11-14 DIAGNOSIS — Z90.49 ACQUIRED ABSENCE OF OTHER SPECIFIED PARTS OF DIGESTIVE TRACT: Chronic | ICD-10-CM

## 2022-11-14 DIAGNOSIS — Z79.84 LONG TERM (CURRENT) USE OF ORAL HYPOGLYCEMIC DRUGS: ICD-10-CM

## 2022-11-14 DIAGNOSIS — Z79.82 LONG TERM (CURRENT) USE OF ASPIRIN: ICD-10-CM

## 2022-11-14 DIAGNOSIS — K21.9 GASTRO-ESOPHAGEAL REFLUX DISEASE WITHOUT ESOPHAGITIS: ICD-10-CM

## 2022-11-14 DIAGNOSIS — Z90.49 ACQUIRED ABSENCE OF OTHER SPECIFIED PARTS OF DIGESTIVE TRACT: ICD-10-CM

## 2022-11-14 DIAGNOSIS — I10 ESSENTIAL (PRIMARY) HYPERTENSION: ICD-10-CM

## 2022-11-14 DIAGNOSIS — G47.33 OBSTRUCTIVE SLEEP APNEA (ADULT) (PEDIATRIC): ICD-10-CM

## 2022-11-14 PROCEDURE — 99283 EMERGENCY DEPT VISIT LOW MDM: CPT

## 2022-11-14 NOTE — ED PROVIDER NOTE - NSDCPRINTRESULTS_ED_ALL_ED
PMD Dr Espitia, Neuro Dr. Chavez Patient requests all Lab, Cardiology, and Radiology Results on their Discharge Instructions

## 2022-11-14 NOTE — ED PROVIDER NOTE - CARE PROVIDER_API CALL
Nura Waggoner)  Physician Assistant Services  [address] [place] [address]  Phone: [phone]  Fax: [phone]  Follow Up Time: 1-3 Days

## 2022-11-14 NOTE — ED PROVIDER NOTE - PATIENT PORTAL LINK FT
You can access the FollowMyHealth Patient Portal offered by [facility] by registering at the following website: [email or web address]. By joining [employer]’s FollowMyHealth portal, you will also be able to view your health information using other applications (apps) compatible with our system.

## 2022-11-14 NOTE — ED PROVIDER NOTE - PHYSICAL EXAMINATION
CONSTITUTIONAL: Well-developed; well-nourished; in no acute distress, nontoxic appearing  SKIN: skin exam is warm and dry  ENT: MMM  CARD: S1, S2 normal, no murmur  RESP: No wheezes, rales or rhonchi. Good air movement bilaterally  EXT: Normal ROM.   NEURO: awake, alert, following commands, oriented, grossly unremarkable. No Focal deficits. GCS 15.   PSYCH: Cooperative, appropriate.

## 2022-11-14 NOTE — ED PROVIDER NOTE - CLINICAL SUMMARY MEDICAL DECISION MAKING FREE TEXT BOX
72-year-old man history of diabetes presents to the ED requesting strips for his glucometer.  Discussed with pharmacy and patient given discount however patient does not want to pay anything for the strips.  Prescription sent to patient's pharmacy and he will contact his insurance regarding this issue.

## 2022-11-14 NOTE — ED PROVIDER NOTE - OBJECTIVE STATEMENT
72 year old male, past medical history dm, htn, eva, obesity, who presents for med refill. patient ran out of glucose test strips x3 days ago. patient last checked FS x3 days ago. no f/c, chest pain, shortness of breath, abd pain, nausea/vomiting, syncope.

## 2022-11-14 NOTE — ED PROVIDER NOTE - NS ED ROS FT
Review of Systems:  	•	CONSTITUTIONAL: no fever  	•	SKIN: no rash  	•	RESPIRATORY: no shortness of breath  	•	CARDIAC: no chest pain  	•	GI: no abd pain, no nausea, no vomiting  	•	MUSCULOSKELETAL: no joint paint, no swelling, no redness  	•	NEUROLOGIC: no weakness, no headache  	•	PSYCH: no anxiety, non suicidal, non homicidal, no hallucination, no depression

## 2022-11-14 NOTE — ED PROVIDER NOTE - NSFOLLOWUPINSTRUCTIONS_ED_ALL_ED_FT
Please follow up with your primary care doctor in 1-3 days  Please be aware of any new or worsening signs or symptoms that should prompt your return to the ER.    What is type 2 diabetes?  Type 2 diabetes is a disorder that disrupts the way your body uses sugar. It is sometimes called type 2 diabetes mellitus.    All the cells in your body need sugar to work normally. Sugar gets into the cells with the help of a hormone called insulin. Insulin is made by the pancreas, an organ in the belly. If there is not enough insulin, or if your body stops responding to insulin, sugar builds up in the blood. That is what happens to people with diabetes.    There are two different types of diabetes:    ?Type 1 diabetes – In type 1 diabetes, the pancreas does not make insulin or makes very little insulin.    ?Type 2 diabetes – In most people with type 2 diabetes, the body stops responding to insulin normally. Then, over time, the pancreas stops making enough insulin.    Being overweight or having obesity increases a person's risk of developing type 2 diabetes. But people who are not overweight can get diabetes, too.    What are the symptoms of type 2 diabetes?  Type 2 diabetes usually causes no symptoms. When symptoms do occur, they include:    ?Needing to urinate often    ?Intense thirst    ?Blurry vision    Can diabetes lead to other health problems?  Yes. Type 2 diabetes might not make you feel sick. But if it is not managed, it can lead to serious problems over time, such as:    ?Heart attacks    ?Strokes    ?Kidney disease    ?Vision problems (or even blindness)    ?Pain or loss of feeling in the hands and feet    ?Needing to have fingers, toes, or other body parts removed (amputated)    How do I know if I have type 2 diabetes?  To find out if you have type 2 diabetes, your doctor or nurse can do a blood test. There are 2 tests that can be used for this. Both involve measuring the amount of sugar in your blood, called your "blood sugar" or "blood glucose":    ?One of the tests measures your blood sugar at the time the blood sample is taken. This test is done in the morning. You can't eat or drink anything except water for at least 8 hours before the test.    ?The other test shows what your average blood sugar has been for the past 2 to 3 months. This blood test is called "hemoglobin A1C" or just "A1C." It can be checked at any time of the day, even if you have recently eaten.    How is type 2 diabetes treated?  The goals of treatment are to control your blood sugar and lower the risk of future problems that can happen in people with diabetes. An important part of managing diabetes is to eat healthy foods and get plenty of physical activity.    There are a few medicines that help control blood sugar. Some people need to take pills that help the body make more insulin or that help insulin do its job. Others need insulin shots.    Depending on what medicines you take, you might need to check your blood sugar regularly at home. But not everyone with type 2 diabetes needs to do this. Your doctor or nurse will tell you if you should be checking your blood sugar, and when and how to do this.    Sometimes, people with type 2 diabetes also need medicines to reduce the problems caused by the disease. For instance, medicines used to lower blood pressure can reduce the chances of a heart attack or stroke.    It's also important to get certain vaccines, such as vaccines to protect against the flu and coronavirus disease 2019 (COVID-19). Some people also need a vaccine to prevent pneumonia.    Can type 2 diabetes be prevented?  Yes. To lower your chances of getting type 2 diabetes, the most important thing you can do is eat a healthy diet and get plenty of physical activity. This can help you lose weight if you are overweight. But eating well and being active are also good for your overall health. Even gentle activity, like walking, has benefits.    If you smoke, quitting can also lower your risk of type 2 diabetes. Quitting smoking can be hard to do, but your doctor or nurse can help.

## 2022-11-14 NOTE — ED PROVIDER NOTE - NS ED ATTENDING STATEMENT MOD
This was a shared visit with the KANDI. I reviewed and verified the documentation and independently performed the documented:

## 2022-11-14 NOTE — ED PROVIDER NOTE - ATTENDING APP SHARED VISIT CONTRIBUTION OF CARE
I personally evaluated the patient. I reviewed the Resident’s or Physician Assistant’s note (as assigned above), and agree with the findings and plan except as documented in my note.   72-year-old man past medical history significant for hypertension, dyslipidemia, diabetes, GERD presents to the ED requesting strips for his glucometer.  As per patient he had been using 2 strips per day rather than 1 to monitor his glucose during a recent bout of diarrhea and used of his strips.  Patient had a telephone discussion with his insurance company and they instructed him to come to the ED for additional strips.  Patient with no complaints.  Patient compliant with diabetic medications.  Vitals noted.  CONSTITUTIONAL: Well-appearing; well-nourished; in no apparent distress.   HEAD: Normocephalic; atraumatic.   EYES: PERRL; EOM intact. Conjunctiva normal B/L.   ENT: Normal pharynx with no tonsillar hypertrophy. MMM.  NECK: Supple; non-tender; no cervical lymphadenopathy.   CHEST: Normal chest excursion with respiration.   CARDIOVASCULAR: Normal S1, S2; no murmurs, rubs, or gallops.   RESPIRATORY: Normal chest excursion with respiration; breath sounds clear and equal bilaterally; no wheezes, rhonchi, or rales.  GI/: Normal bowel sounds; non-distended; non-tender.

## 2022-11-14 NOTE — ED ADULT TRIAGE NOTE - CHIEF COMPLAINT QUOTE
Pt states his insurance will not approve his glucometer test strips and told him to come to the ER. Pt denies symptoms FS 78 in triage yes

## 2022-11-14 NOTE — ED ADULT NURSE NOTE - OBJECTIVE STATEMENT
Clinic Administered Medication Documentation      Injectable Medication Documentation    Patient was given Cyanocobalamin (B-12). Prior to medication administration, verified patients identity using patient s name and date of birth. Please see MAR and medication order for additional information. Patient instructed to remain in clinic for 15 minutes and report any adverse reaction to staff immediately .      Was entire vial of medication used? Yes  Vial/Syringe: Single dose vial  Expiration Date:  08/22  Was this medication supplied by the patient? No     Given in left deltoid per patient request.    Nella Nath MA     2/16/2021       As per pt, "I need new glucose strips."

## 2022-11-14 NOTE — ED ADULT NURSE NOTE - CHIEF COMPLAINT QUOTE
Pt states his insurance will not approve his glucometer test strips and told him to come to the ER. Pt denies symptoms FS 78 in triage

## 2022-11-14 NOTE — ED ADULT NURSE NOTE - ISOLATION TYPE:
None Modified Advancement Flap Text: The defect edges were debeveled with a #15 scalpel blade.  Given the location of the defect, shape of the defect and the proximity to free margins a modified advancement flap was deemed most appropriate.  Using a sterile surgical marker, an appropriate advancement flap was drawn incorporating the defect and placing the expected incisions within the relaxed skin tension lines where possible.    The area thus outlined was incised deep to adipose tissue with a #15 scalpel blade.  The skin margins were undermined to an appropriate distance in all directions utilizing iris scissors.

## 2023-01-06 ENCOUNTER — APPOINTMENT (OUTPATIENT)
Dept: CARDIOLOGY | Facility: CLINIC | Age: 73
End: 2023-01-06

## 2023-01-26 ENCOUNTER — APPOINTMENT (OUTPATIENT)
Dept: CARDIOLOGY | Facility: CLINIC | Age: 73
End: 2023-01-26
Payer: MEDICARE

## 2023-01-26 VITALS
BODY MASS INDEX: 26.32 KG/M2 | WEIGHT: 188 LBS | TEMPERATURE: 97.3 F | DIASTOLIC BLOOD PRESSURE: 60 MMHG | HEART RATE: 60 BPM | SYSTOLIC BLOOD PRESSURE: 114 MMHG | HEIGHT: 71 IN

## 2023-01-26 DIAGNOSIS — Z91.89 OTHER SPECIFIED PERSONAL RISK FACTORS, NOT ELSEWHERE CLASSIFIED: ICD-10-CM

## 2023-01-26 DIAGNOSIS — G47.30 SLEEP APNEA, UNSPECIFIED: ICD-10-CM

## 2023-01-26 PROCEDURE — 93000 ELECTROCARDIOGRAM COMPLETE: CPT

## 2023-01-26 PROCEDURE — 99214 OFFICE O/P EST MOD 30 MIN: CPT | Mod: 25

## 2023-01-26 RX ORDER — PRAVASTATIN SODIUM 40 MG/1
40 TABLET ORAL
Refills: 0 | Status: DISCONTINUED | COMMUNITY
End: 2023-01-26

## 2023-01-26 NOTE — PHYSICAL EXAM
[General Appearance - Well Developed] : well developed [Well Groomed] : well groomed [Normal Appearance] : normal appearance [General Appearance - Well Nourished] : well nourished [No Deformities] : no deformities [Normal Conjunctiva] : the conjunctiva exhibited no abnormalities [Normal Oral Mucosa] : normal oral mucosa [Normal Oropharynx] : normal oropharynx [Respiration, Rhythm And Depth] : normal respiratory rhythm and effort [Exaggerated Use Of Accessory Muscles For Inspiration] : no accessory muscle use [Heart Rate And Rhythm] : heart rate and rhythm were normal [Heart Sounds] : normal S1 and S2 [Abdomen Mass (___ Cm)] : no abdominal mass palpated [Abnormal Walk] : normal gait [Nail Clubbing] : no clubbing of the fingernails [Cyanosis, Localized] : no localized cyanosis [Skin Turgor] : normal skin turgor [Skin Color & Pigmentation] : normal skin color and pigmentation [] : no rash [Oriented To Time, Place, And Person] : oriented to person, place, and time [Affect] : the affect was normal [Impaired Insight] : insight and judgment were intact [No Anxiety] : not feeling anxious [FreeTextEntry1] : No JVD

## 2023-01-26 NOTE — REASON FOR VISIT
[CV Risk Factors and Non-Cardiac Disease] : CV risk factors and non-cardiac disease [Arrhythmia/ECG Abnorrmalities] : arrhythmia/ECG abnormalities [Hyperlipidemia] : hyperlipidemia [Follow-Up - Clinic] : a clinic follow-up of [Atrial Fibrillation] : atrial fibrillation [Hypertension] : hypertension [Medication Management] : Medication management [FreeTextEntry3] : Marcos

## 2023-01-26 NOTE — CARDIOLOGY SUMMARY
[___] : [unfilled] [LVEF ___%] : LVEF [unfilled]% [None] : normal LV function [de-identified] : 6-1-2022 NSR with marked first degree AV block PAC RBBB \par 1- NSR first degree AV block RBBB LAFB  [de-identified] : 11- EF 55-60% mild MR misl MR mild AS Ascending aorta was 3.8 cm

## 2023-01-26 NOTE — ASSESSMENT
[FreeTextEntry1] : The patient has lost 52 pounds since June of last yeat . He was diagnosed as having Celiac disease . He has been feeling well. The patient has AS and MS which is mild . The patient has  had no lightheadedness as well . The patient has a trifasciular block with RBBB LAFB and marked first degree AV block . The patient has been seen by EP and had an ILM which was recently explanted . He has had no bradyarrhythmias . I have explained to the patient of the need for possible PPM in the future .

## 2023-01-26 NOTE — HISTORY OF PRESENT ILLNESS
[FreeTextEntry1] : The patient was told of Celiac disease . Known history of marked first degree AV block and bifascicular block . Had ILM which did not show progression of heart block. HE has had no symptoms . He is not o AV patrick blocking medication . The patiet has DEONNA butr has refused treatment

## 2023-06-06 ENCOUNTER — EMERGENCY (EMERGENCY)
Facility: HOSPITAL | Age: 73
LOS: 0 days | Discharge: LEFT BEFORE DISCHARGE | End: 2023-06-06
Attending: EMERGENCY MEDICINE
Payer: MEDICARE

## 2023-06-06 VITALS
HEART RATE: 90 BPM | DIASTOLIC BLOOD PRESSURE: 51 MMHG | TEMPERATURE: 98 F | OXYGEN SATURATION: 99 % | RESPIRATION RATE: 18 BRPM | SYSTOLIC BLOOD PRESSURE: 94 MMHG

## 2023-06-06 VITALS
WEIGHT: 177.91 LBS | RESPIRATION RATE: 18 BRPM | SYSTOLIC BLOOD PRESSURE: 124 MMHG | HEART RATE: 69 BPM | OXYGEN SATURATION: 99 % | TEMPERATURE: 99 F | HEIGHT: 71 IN | DIASTOLIC BLOOD PRESSURE: 58 MMHG

## 2023-06-06 DIAGNOSIS — I44.0 ATRIOVENTRICULAR BLOCK, FIRST DEGREE: ICD-10-CM

## 2023-06-06 DIAGNOSIS — G47.33 OBSTRUCTIVE SLEEP APNEA (ADULT) (PEDIATRIC): ICD-10-CM

## 2023-06-06 DIAGNOSIS — Z79.84 LONG TERM (CURRENT) USE OF ORAL HYPOGLYCEMIC DRUGS: ICD-10-CM

## 2023-06-06 DIAGNOSIS — K21.9 GASTRO-ESOPHAGEAL REFLUX DISEASE WITHOUT ESOPHAGITIS: ICD-10-CM

## 2023-06-06 DIAGNOSIS — I48.91 UNSPECIFIED ATRIAL FIBRILLATION: ICD-10-CM

## 2023-06-06 DIAGNOSIS — E11.9 TYPE 2 DIABETES MELLITUS WITHOUT COMPLICATIONS: ICD-10-CM

## 2023-06-06 DIAGNOSIS — Z90.49 ACQUIRED ABSENCE OF OTHER SPECIFIED PARTS OF DIGESTIVE TRACT: Chronic | ICD-10-CM

## 2023-06-06 DIAGNOSIS — Z87.891 PERSONAL HISTORY OF NICOTINE DEPENDENCE: ICD-10-CM

## 2023-06-06 DIAGNOSIS — K90.0 CELIAC DISEASE: ICD-10-CM

## 2023-06-06 DIAGNOSIS — Z53.29 PROCEDURE AND TREATMENT NOT CARRIED OUT BECAUSE OF PATIENT'S DECISION FOR OTHER REASONS: ICD-10-CM

## 2023-06-06 DIAGNOSIS — R05.9 COUGH, UNSPECIFIED: ICD-10-CM

## 2023-06-06 DIAGNOSIS — R09.89 OTHER SPECIFIED SYMPTOMS AND SIGNS INVOLVING THE CIRCULATORY AND RESPIRATORY SYSTEMS: ICD-10-CM

## 2023-06-06 DIAGNOSIS — R55 SYNCOPE AND COLLAPSE: ICD-10-CM

## 2023-06-06 DIAGNOSIS — Z79.82 LONG TERM (CURRENT) USE OF ASPIRIN: ICD-10-CM

## 2023-06-06 DIAGNOSIS — I10 ESSENTIAL (PRIMARY) HYPERTENSION: ICD-10-CM

## 2023-06-06 DIAGNOSIS — J06.9 ACUTE UPPER RESPIRATORY INFECTION, UNSPECIFIED: ICD-10-CM

## 2023-06-06 DIAGNOSIS — K40.90 UNILATERAL INGUINAL HERNIA, WITHOUT OBSTRUCTION OR GANGRENE, NOT SPECIFIED AS RECURRENT: ICD-10-CM

## 2023-06-06 LAB
ALBUMIN SERPL ELPH-MCNC: 4.3 G/DL — SIGNIFICANT CHANGE UP (ref 3.5–5.2)
ALP SERPL-CCNC: 92 U/L — SIGNIFICANT CHANGE UP (ref 30–115)
ALT FLD-CCNC: 14 U/L — SIGNIFICANT CHANGE UP (ref 0–41)
ANION GAP SERPL CALC-SCNC: 12 MMOL/L — SIGNIFICANT CHANGE UP (ref 7–14)
AST SERPL-CCNC: 20 U/L — SIGNIFICANT CHANGE UP (ref 0–41)
BASOPHILS # BLD AUTO: 0.01 K/UL — SIGNIFICANT CHANGE UP (ref 0–0.2)
BASOPHILS NFR BLD AUTO: 0.2 % — SIGNIFICANT CHANGE UP (ref 0–1)
BILIRUB SERPL-MCNC: 1 MG/DL — SIGNIFICANT CHANGE UP (ref 0.2–1.2)
BUN SERPL-MCNC: 18 MG/DL — SIGNIFICANT CHANGE UP (ref 10–20)
CALCIUM SERPL-MCNC: 9.7 MG/DL — SIGNIFICANT CHANGE UP (ref 8.4–10.4)
CHLORIDE SERPL-SCNC: 101 MMOL/L — SIGNIFICANT CHANGE UP (ref 98–110)
CO2 SERPL-SCNC: 24 MMOL/L — SIGNIFICANT CHANGE UP (ref 17–32)
CREAT SERPL-MCNC: 0.7 MG/DL — SIGNIFICANT CHANGE UP (ref 0.7–1.5)
EGFR: 99 ML/MIN/1.73M2 — SIGNIFICANT CHANGE UP
EOSINOPHIL # BLD AUTO: 0.12 K/UL — SIGNIFICANT CHANGE UP (ref 0–0.7)
EOSINOPHIL NFR BLD AUTO: 2.2 % — SIGNIFICANT CHANGE UP (ref 0–8)
GLUCOSE SERPL-MCNC: 117 MG/DL — HIGH (ref 70–99)
HCT VFR BLD CALC: 44.8 % — SIGNIFICANT CHANGE UP (ref 42–52)
HGB BLD-MCNC: 15.1 G/DL — SIGNIFICANT CHANGE UP (ref 14–18)
IMM GRANULOCYTES NFR BLD AUTO: 0.2 % — SIGNIFICANT CHANGE UP (ref 0.1–0.3)
LYMPHOCYTES # BLD AUTO: 1.47 K/UL — SIGNIFICANT CHANGE UP (ref 1.2–3.4)
LYMPHOCYTES # BLD AUTO: 27.2 % — SIGNIFICANT CHANGE UP (ref 20.5–51.1)
MCHC RBC-ENTMCNC: 29.7 PG — SIGNIFICANT CHANGE UP (ref 27–31)
MCHC RBC-ENTMCNC: 33.7 G/DL — SIGNIFICANT CHANGE UP (ref 32–37)
MCV RBC AUTO: 88.2 FL — SIGNIFICANT CHANGE UP (ref 80–94)
MONOCYTES # BLD AUTO: 0.62 K/UL — HIGH (ref 0.1–0.6)
MONOCYTES NFR BLD AUTO: 11.5 % — HIGH (ref 1.7–9.3)
NEUTROPHILS # BLD AUTO: 3.18 K/UL — SIGNIFICANT CHANGE UP (ref 1.4–6.5)
NEUTROPHILS NFR BLD AUTO: 58.7 % — SIGNIFICANT CHANGE UP (ref 42.2–75.2)
NRBC # BLD: 0 /100 WBCS — SIGNIFICANT CHANGE UP (ref 0–0)
PLATELET # BLD AUTO: 182 K/UL — SIGNIFICANT CHANGE UP (ref 130–400)
PMV BLD: 11.2 FL — HIGH (ref 7.4–10.4)
POTASSIUM SERPL-MCNC: 4.7 MMOL/L — SIGNIFICANT CHANGE UP (ref 3.5–5)
POTASSIUM SERPL-SCNC: 4.7 MMOL/L — SIGNIFICANT CHANGE UP (ref 3.5–5)
PROT SERPL-MCNC: 7 G/DL — SIGNIFICANT CHANGE UP (ref 6–8)
RBC # BLD: 5.08 M/UL — SIGNIFICANT CHANGE UP (ref 4.7–6.1)
RBC # FLD: 13.5 % — SIGNIFICANT CHANGE UP (ref 11.5–14.5)
SODIUM SERPL-SCNC: 137 MMOL/L — SIGNIFICANT CHANGE UP (ref 135–146)
TROPONIN T SERPL-MCNC: <0.01 NG/ML — SIGNIFICANT CHANGE UP
TROPONIN T SERPL-MCNC: <0.01 NG/ML — SIGNIFICANT CHANGE UP
WBC # BLD: 5.41 K/UL — SIGNIFICANT CHANGE UP (ref 4.8–10.8)
WBC # FLD AUTO: 5.41 K/UL — SIGNIFICANT CHANGE UP (ref 4.8–10.8)

## 2023-06-06 PROCEDURE — 70450 CT HEAD/BRAIN W/O DYE: CPT | Mod: MA

## 2023-06-06 PROCEDURE — 99223 1ST HOSP IP/OBS HIGH 75: CPT

## 2023-06-06 PROCEDURE — 80053 COMPREHEN METABOLIC PANEL: CPT

## 2023-06-06 PROCEDURE — 84484 ASSAY OF TROPONIN QUANT: CPT

## 2023-06-06 PROCEDURE — 93010 ELECTROCARDIOGRAM REPORT: CPT | Mod: 77

## 2023-06-06 PROCEDURE — 36415 COLL VENOUS BLD VENIPUNCTURE: CPT

## 2023-06-06 PROCEDURE — G0378: CPT

## 2023-06-06 PROCEDURE — 71046 X-RAY EXAM CHEST 2 VIEWS: CPT

## 2023-06-06 PROCEDURE — 71046 X-RAY EXAM CHEST 2 VIEWS: CPT | Mod: 26

## 2023-06-06 PROCEDURE — 85025 COMPLETE CBC W/AUTO DIFF WBC: CPT

## 2023-06-06 PROCEDURE — 93005 ELECTROCARDIOGRAM TRACING: CPT

## 2023-06-06 PROCEDURE — 93010 ELECTROCARDIOGRAM REPORT: CPT

## 2023-06-06 PROCEDURE — 96374 THER/PROPH/DIAG INJ IV PUSH: CPT

## 2023-06-06 PROCEDURE — 94640 AIRWAY INHALATION TREATMENT: CPT

## 2023-06-06 PROCEDURE — 99285 EMERGENCY DEPT VISIT HI MDM: CPT | Mod: 25

## 2023-06-06 PROCEDURE — 70450 CT HEAD/BRAIN W/O DYE: CPT | Mod: 26,MA

## 2023-06-06 RX ORDER — SODIUM CHLORIDE 9 MG/ML
1000 INJECTION, SOLUTION INTRAVENOUS ONCE
Refills: 0 | Status: COMPLETED | OUTPATIENT
Start: 2023-06-06 | End: 2023-06-06

## 2023-06-06 RX ORDER — IPRATROPIUM/ALBUTEROL SULFATE 18-103MCG
3 AEROSOL WITH ADAPTER (GRAM) INHALATION ONCE
Refills: 0 | Status: COMPLETED | OUTPATIENT
Start: 2023-06-06 | End: 2023-06-06

## 2023-06-06 RX ADMIN — Medication 3 MILLILITER(S): at 08:13

## 2023-06-06 RX ADMIN — SODIUM CHLORIDE 1000 MILLILITER(S): 9 INJECTION, SOLUTION INTRAVENOUS at 10:19

## 2023-06-06 RX ADMIN — Medication 125 MILLIGRAM(S): at 08:14

## 2023-06-06 NOTE — ED ADULT TRIAGE NOTE - CHIEF COMPLAINT QUOTE
Pt reports he passed out  2x on Thursday. woke up on the floor.  reports feeling lightheaded since but reports feeling ok today .   per pt Pt reports he passed out  2x on Thursday. woke up on the floor.  reports feeling lightheaded since but reports feeling ok today . pt also stated that since he fell he has been checking his HR which has been in the 50s    per pt his doctor told him he will eventually need a pacemaker,

## 2023-06-06 NOTE — ED CDU PROVIDER DISPOSITION NOTE - PATIENT PORTAL LINK FT
You can access the FollowMyHealth Patient Portal offered by [facility] by registering at the following website: [email or web address]. By joining [employer]’s FollowMyHealth portal, you will also be able to view your health information using other applications (apps) compatible with our system.

## 2023-06-06 NOTE — ED PROVIDER NOTE - PROGRESS NOTE DETAILS
Authored by Dr. Villafana: labs and imaging reviewed with pt. pending callback from dr snyder Resident AO: Case discussed with patient's cardiologist Dr. Denney, who advised for observation for cardiac rhythm.   	Patient had an echo performed in November 2022:   1.Normal global left ventricular systolic function.  2.Mildly increased left ventricular internal cavity size.  3.The left ventricular ejection fraction is estimated at 55-60%.  4.Grade I Diastolic dysfunction with normal LAP.  5.TAPSE is 28mm.  6.LA volume index is normal 27cc/m2.  7.RA volume index is 27cc/m2.  8.There is no evidence of pericardial effusion.  9.Mild mitral valve regurgitation.  10.There is a peak and mean diastolic gradient of 10 mmhg and 5 mmhg across the Mitral Valve   and MVA of 1.9cm2 by PHT c/w mild MS.  11.Mild tricuspid regurgitation.  12.Mild aortic valve stenosis.  13.There is a peak and mean systolic gradient of 16 mmhg and 9 mmhg across the AV with GENEVA   of 1.7cm2 and dimensionless index of 0.59c/w mild AS . LVSI was 35cc/m2.  14.Trace pulmonic valve regurgitation.  15.Mild dilatation of the ascending aorta measuring approximately 3.8cm.    Patient agrees for Observation overnight, and if no ectopy on the monitor to be discharged in the morning.

## 2023-06-06 NOTE — ED CDU PROVIDER DISPOSITION NOTE - NSFOLLOWUPINSTRUCTIONS_ED_ALL_ED_FT
Syncope    Syncope is when you temporarily lose consciousness, also called fainting or passing out. It is caused by a sudden decrease in blood flow to the brain. Even though most causes of syncope are not dangerous, syncope can possibly be a sign of a serious medical problem. Signs that you may be about to faint include feeling dizzy, lightheaded, nausea, visual changes, or cold/clammy skin. Do not drive, operate heavy machinery, or play sports until your health care provider says it is okay.    SEEK IMMEDIATE MEDICAL CARE IF YOU HAVE ANY OF THE FOLLOWING SYMPTOMS: severe headache, pain in your chest/abdomen/back, bleeding from your mouth or rectum, palpitations, shortness of breath, pain with breathing, seizure, confusion, or trouble walking.    The patient has decided to leave against medical advice.  It has been explained to the patient that leaving prior to completion of work up and treatment may result in recurrent or worsening of symptoms, severe permanent disability, pain and suffering, and/or even death.  The patient has demonstrated comprehension and verbalizes understanding of these risks.  The patient has been told that he/she must return to the ER immediately for persistent or recurring symptoms, worsening symptoms, or any concerning symptoms.  The patient has also been informed that they may return to the ER immediately at any time if they change their mind and wish to resume care. The patient has been given the opportunity to ask questions about their medical condition.

## 2023-06-06 NOTE — ED PROVIDER NOTE - PHYSICAL EXAMINATION
_  Vital signs reviewed; ABCs intact  GENERAL: Well nourished, comfortable  SKIN: Warm, dry  HEAD & NECK: NCAT, supple neck  EYES: EOMI, PER B/L  ENT: MMM  CARD: RRR, S1, S2; no murmurs, no rubs, no gallops  RESP: Normal respiratory effort, CTAB, no rales, no wheezing  ABD: Soft, ND, NT, no rebound, no guarding; +reducible hernia in the right inguinal region  EXT: Pulses palpable distally; no edema; no calf tenderness; symmetrical calf circumferences  MSK: Normal tone and bulk, no bony tenderness, motor strength intact and symmetrical in all 4 extremities  NEURO: No facial droop, no gaze palsy, no drift in B/L UEs and B/L LEs, no sensory loss, no aphasia, no extinction/inattention/neglect  PSYCH: AAOx3, cooperative, appropriate

## 2023-06-06 NOTE — ED ADULT NURSE NOTE - NSFALLHARMRISKINTERV_ED_ALL_ED
Assistance OOB with selected safe patient handling equipment if applicable/Communicate risk of Fall with Harm to all staff, patient, and family/Orthostatic vital signs/Provide visual cue: red socks, yellow wristband, yellow gown, etc/Reinforce activity limits and safety measures with patient and family/Bed in lowest position, wheels locked, appropriate side rails in place/Call bell, personal items and telephone in reach/Instruct patient to call for assistance before getting out of bed/chair/stretcher/Non-slip footwear applied when patient is off stretcher/[place] to call system/Physically safe environment - no spills, clutter or unnecessary equipment/Purposeful Proactive Rounding/Room/bathroom lighting operational, light cord in reach

## 2023-06-06 NOTE — ED PROVIDER NOTE - NSFOLLOWUPINSTRUCTIONS_ED_ALL_ED_FT
You were evaluated in the Emergency Department today, and your visit did not reveal anything immediately life-threatening.    However, it is important that you follow-up with your PRIMARY CARE PHYSICIAN within 3-5 days.  If you do not have a primary care physician, please call your health insurance to select one.  If you do not have health insurance, please schedule an appointment with the [facility]: [phone], located at [address] [place] [address].    Additionally, it is important that you follow-up with SPECIALIST.   If you are unable to schedule a visit(s) with the provided specialist(s), please speak with your primary care doctor for guidance to such a specialist.    ---------------------------------------------------------------------------------------------------    Syncope    Syncope is when you temporarily lose consciousness, also called fainting or passing out. It is caused by a sudden decrease in blood flow to the brain. Even though most causes of syncope are not dangerous, syncope can possibly be a sign of a serious medical problem. Signs that you may be about to faint include feeling dizzy, lightheaded, nausea, visual changes, or cold/clammy skin. Do not drive, operate heavy machinery, or play sports until your health care provider says it is okay.    SEEK IMMEDIATE MEDICAL CARE IF YOU HAVE ANY OF THE FOLLOWING SYMPTOMS: severe headache, pain in your chest/abdomen/back, bleeding from your mouth or rectum, palpitations, shortness of breath, pain with breathing, seizure, confusion, or trouble walking.    ---------------------------------------------------------------------------------------------------    Hernia    A hernia is when fat or the intestines push through a weak area in the abdominal wall. This can occur around the belly button (umbilical hernia) or where the leg meets the lower abdomen (inguinal hernia). This creates a rounded lump (bulge). Hernias that can be pushed back into the belly are called reducible and those that cannot are called incarcerated. Hernias that are not reducible and lose their blood supply are called strangulated and require emergency surgery. Hernias can be caused or worsened when straining during bowel movements or lifting heavy objects as well as coughing or sneezing. Surgery may be helpful in treating this condition so follow up with a surgeon.    SEEK IMMEDIATE MEDICAL CARE IF YOU HAVE ANY OF THE FOLLOWING SYMPTOMS: worsening abdominal pain, change in color over the hernia, nausea/vomiting, or inability to have a bowel movement or pass gas.    ---------------------------------------------------------------------------------------------------    Viral Respiratory Infection    A viral respiratory infection is an illness that affects parts of the body used for breathing, like the lungs, nose, and throat. It is caused by a germ called a virus. Symptoms can include runny nose, coughing, sneezing, fatigue, body aches, sore throat, fever, or headache. Over the counter medicine can be used to manage the symptoms but the infection typically goes away on its own in 5 to 10 days.     SEEK IMMEDIATE MEDICAL CARE IF YOU HAVE ANY OF THE FOLLOWING SYMPTOMS: shortness of breath, chest pain, fever over 10 days, or lightheadedness/dizziness.

## 2023-06-06 NOTE — ED PROVIDER NOTE - CARE PLAN
1 Principal Discharge DX:	Syncope  Secondary Diagnosis:	Upper respiratory infection  Secondary Diagnosis:	Right inguinal hernia

## 2023-06-06 NOTE — ED CDU PROVIDER INITIAL DAY NOTE - PHYSICAL EXAMINATION
CONST: Well appearing in NAD  EYES: PERRL, EOMI, Sclera and conjunctiva clear.   NECK: Non-tender, no meningeal signs  CARD: irreg rhythm. Normal rate  RESP: Equal BS B/L, No wheezes, rhonchi or rales. No distress  GI: Soft, non-tender, non-distended.  MS: Normal ROM in all extremities. No midline spinal tenderness.  SKIN: Warm, dry, no acute rashes. Good turgor  NEURO: A&Ox3, No focal deficits. Strength 5/5 with no sensory deficits. Steady gait

## 2023-06-06 NOTE — ED CDU PROVIDER INITIAL DAY NOTE - OBJECTIVE STATEMENT
Patient is a 72-year-old man with a history of diabetes, hypertension, DEONNA not on CPAP, celiac disease, prior fourniers gangrene, former smoker, right-sided inguinal hernia.  Patient presents for evaluation of syncope on 6/2 (4 days ago).  Patient was walking up the stairs 4 days ago, felt lightheaded, and syncopized. He may have hit his head; not on anticoagulation.    Approximately 10 minutes later, patient syncopized again, this time not while exerting himself.  For both episodes: He was unconscious for less than 2 minutes, and was immediately oriented x3 on awakening.  No seizure-like activity during.  No prior or subsequent chest pain or shortness of breath or palpitations.  No prior episodes.  Has been able to exert himself without symptoms before and after. Patient has been having light-headedness occasionally ever since, but otherwise feeling at baseline.     He also endorses cough and chest congestion for the past 2 days (grandchildren and son with similar symptoms). No fever, and cough is not productive.     No other complaints - no sore throat, CP, palpitations, SOB, abd pain, NVD, dysuria, hematuria, FND, rash, melena, hematochezia.    Patient also mentions that he has a bulge in his right lower abdomen/groin.  He is able to push the bulge back in.     Placed in OBS after d/w patients cardiologist Dr. Coyne who wants 24 hour monitoring Patient is a 72-year-old man with a history of diabetes, hypertension, DEONNA not on CPAP, celiac disease, prior fourniers gangrene, former smoker, right-sided inguinal hernia.  Patient presents for evaluation of syncope on 6/2 (4 days ago).  Patient was walking up the stairs 4 days ago, felt lightheaded, and syncopized. He may have hit his head; not on anticoagulation.    Approximately 10 minutes later, patient syncopized again, this time not while exerting himself.  For both episodes: He was unconscious for less than 2 minutes, and was immediately oriented x3 on awakening.  No seizure-like activity during.  No prior or subsequent chest pain or shortness of breath or palpitations.  No prior episodes.  Has been able to exert himself without symptoms before and after. Patient has been having light-headedness occasionally ever since, but otherwise feeling at baseline.     He also endorses cough and chest congestion for the past 2 days (grandchildren and son with similar symptoms). No fever, and cough is not productive.     No other complaints - no sore throat, CP, palpitations, SOB, abd pain, NVD, dysuria, hematuria, FND, rash, melena, hematochezia.    Patient also mentions that he has a bulge in his right lower abdomen/groin.  He is able to push the bulge back in.     Placed in OBS after d/w patients cardiologist Dr. Coyne who wants 24 hour monitoring. Had TTE 7 months ago w/o significant disease noted

## 2023-06-06 NOTE — ED PROVIDER NOTE - CLINICAL SUMMARY MEDICAL DECISION MAKING FREE TEXT BOX
Syncope.  No focal neurodeficits.  6 days ago.    Communicated with cardiologist.    Labs and EKG were ordered and reviewed by me.  Imaging was ordered and reviewed by me..   Patient's records prior EKG and prior imaging were reviewed.  Additional history was obtained from patient's cardiologist. Escalation to admission/observation was considered.  However patient feels much better and is comfortable with discharge.  Appropriate follow-up was arranged.

## 2023-06-06 NOTE — ED ADULT NURSE NOTE - CHIEF COMPLAINT QUOTE
Pt reports he passed out  2x on Thursday. woke up on the floor.  reports feeling lightheaded since but reports feeling ok today . pt also stated that since he fell he has been checking his HR which has been in the 50s    per pt his doctor told him he will eventually need a pacemaker,

## 2023-06-06 NOTE — ED PROVIDER NOTE - OBJECTIVE STATEMENT
Patient is a 72-year-old man with a history of diabetes, hypertension, DEONNA not on CPAP, celiac disease, prior foreign years gangrene, former smoker, right-sided inguinal hernia.  Patient presents for evaluation of syncope on 6/2 (4 days ago).  Patient was walking up the stairs 4 days ago, felt lightheaded, and syncopized. He may have hit his head; not on anticoagulation.    Approximately 10 minutes later, patient syncopized again, this time not while exerting himself.  For both episodes: He was unconscious for less than 2 minutes, and was immediately oriented x3 on awakening.  No seizure-like activity during.  No prior or subsequent chest pain or shortness of breath or palpitations.  No prior episodes.  Has been able to exert himself without symptoms before and after. Patient has been having light-headedness occasionally ever sinc, but otherwise feeling at baseline.     He also endorses cough and chest congestion for the past 2 days (grandchildren and son with similar symptoms). No fever, and cough is not productive.     No other complaints - no sore throat, CP, palpitations, SOB, abd pain, NVD, dysuria, hematuria, FND, rash, melena, hematochezia.    Patient also mentions that he has a bulge in his right lower abdomen/groin.  He is able to push the bulge back in. Patient is a 72-year-old man with a history of diabetes, hypertension, DEONNA not on CPAP, celiac disease, prior foreign years gangrene, former smoker, right-sided inguinal hernia.  Patient presents for evaluation of syncope on 6/2 (4 days ago).  Patient was walking up the stairs 4 days ago, felt lightheaded, and syncopized. He may have hit his head; not on anticoagulation.    Approximately 10 minutes later, patient syncopized again, this time not while exerting himself.  For both episodes: He was unconscious for less than 2 minutes, and was immediately oriented x3 on awakening.  No seizure-like activity during.  No prior or subsequent chest pain or shortness of breath or palpitations.  No prior episodes.  Has been able to exert himself without symptoms before and after. Patient has been having light-headedness occasionally ever sinc, but otherwise feeling at baseline.     He also endorses cough and chest congestion for the past 2 days (grandchildren and son with similar symptoms). No fever, and cough is not productive.     No other complaints - no sore throat, CP, palpitations, SOB, abd pain, NVD, dysuria, hematuria, FND, rash, melena, hematochezia.    Patient also mentions that he has a bulge in his right lower abdomen/groin.  He is able to push the bulge back in.     See progress notes below for further history/progression of ED course.

## 2023-06-06 NOTE — ED CDU PROVIDER DISPOSITION NOTE - CARE PROVIDER_API CALL
Jones Coyne  Cardiovascular Disease  [address] [place] [address], [address] [address]  Phone: [phone]  Fax: [phone]  Follow Up Time: 1-3 Days

## 2023-06-06 NOTE — ED PROVIDER NOTE - ATTENDING CONTRIBUTION TO CARE
72-year-old male history of diabetes, hypertension, followed by Dr. Coyne cardiologist, history of AV block, A-fib, now presents with episode of syncope x2 6 days ago.  Was somewhat dizzy.  Called Dr. Denney told to come to the emergency department for evaluation.  Today patient feels 100% fine.  No symptoms.  No chest pain no shortness of breath no head injury no headache no vomiting no abdominal pain no back pain.    vss, nontoxic, well appearing, pink conj, anicteric, MMM, neck supple, CTAB, RRR, equal radial pulses bilat, abd soft/nt/nd, no cva tend. no calves tend, no edema, no fnd. no rashes.

## 2023-06-15 ENCOUNTER — APPOINTMENT (OUTPATIENT)
Dept: SURGERY | Facility: CLINIC | Age: 73
End: 2023-06-15
Payer: MEDICARE

## 2023-06-15 VITALS
HEIGHT: 71 IN | DIASTOLIC BLOOD PRESSURE: 78 MMHG | SYSTOLIC BLOOD PRESSURE: 120 MMHG | HEART RATE: 54 BPM | TEMPERATURE: 97.2 F | BODY MASS INDEX: 25.48 KG/M2 | OXYGEN SATURATION: 98 % | WEIGHT: 182 LBS

## 2023-06-15 PROCEDURE — 99203 OFFICE O/P NEW LOW 30 MIN: CPT

## 2023-06-22 ENCOUNTER — NON-APPOINTMENT (OUTPATIENT)
Age: 73
End: 2023-06-22

## 2023-06-22 NOTE — PHYSICAL EXAM
[de-identified] : No acute distress [de-identified] : Nonlabored respirations [de-identified] : Soft, reducible right inguinal hernia with no overlying skin changes

## 2023-06-22 NOTE — HISTORY OF PRESENT ILLNESS
[de-identified] : 72-year-old male who presents for evaluation of right inguinal hernia.\par \par States he originally went to his primary care doctor when he noticed a lump in his groin.  His PCP thought it was a fungus and he was prescribed a cream but it did not improve.  When he went to the emergency room recently for a syncopal episode, he was told it was a hernia.\par \par Had a previous history of a loop recorder which she asked to be removed because he keeps having events.  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

## 2023-06-22 NOTE — ASSESSMENT
[FreeTextEntry1] : 72-year-old male with extensive cardiac history and symptomatic right inguinal scrotal hernia, reducible\par \par Risks and benefits of proceeding with right inguinal hernia repair with mesh were explained to the patient\par Given his cardiac history this would be best performed as an open procedure to limit anesthesia\par He will need to see his cardiologist for optimization and possible pacemaker placement prior to hernia surgery

## 2023-06-28 LAB
ALBUMIN SERPL ELPH-MCNC: 4.2 G/DL
ALP BLD-CCNC: 111 U/L
ALT SERPL-CCNC: 9 U/L
ANION GAP SERPL CALC-SCNC: 11 MMOL/L
AST SERPL-CCNC: 14 U/L
BILIRUB SERPL-MCNC: 0.8 MG/DL
BUN SERPL-MCNC: 18 MG/DL
CALCIUM SERPL-MCNC: 9.5 MG/DL
CHLORIDE SERPL-SCNC: 103 MMOL/L
CHOLEST SERPL-MCNC: 108 MG/DL
CO2 SERPL-SCNC: 25 MMOL/L
CREAT SERPL-MCNC: 1 MG/DL
EGFR: 81 ML/MIN/1.73M2
ESTIMATED AVERAGE GLUCOSE: 131 MG/DL
GLUCOSE SERPL-MCNC: 108 MG/DL
HBA1C MFR BLD HPLC: 6.2 %
HDLC SERPL-MCNC: 57 MG/DL
LDLC SERPL CALC-MCNC: 44 MG/DL
NONHDLC SERPL-MCNC: 51 MG/DL
POTASSIUM SERPL-SCNC: 4.6 MMOL/L
PROT SERPL-MCNC: 6.3 G/DL
SODIUM SERPL-SCNC: 139 MMOL/L
TRIGL SERPL-MCNC: 35 MG/DL

## 2023-06-30 ENCOUNTER — OUTPATIENT (OUTPATIENT)
Dept: OUTPATIENT SERVICES | Facility: HOSPITAL | Age: 73
LOS: 1 days | End: 2023-06-30
Payer: MEDICARE

## 2023-06-30 VITALS
RESPIRATION RATE: 18 BRPM | OXYGEN SATURATION: 98 % | TEMPERATURE: 96 F | DIASTOLIC BLOOD PRESSURE: 63 MMHG | WEIGHT: 184.97 LBS | HEIGHT: 71 IN | SYSTOLIC BLOOD PRESSURE: 143 MMHG | HEART RATE: 65 BPM

## 2023-06-30 DIAGNOSIS — Z01.818 ENCOUNTER FOR OTHER PREPROCEDURAL EXAMINATION: ICD-10-CM

## 2023-06-30 DIAGNOSIS — Z98.890 OTHER SPECIFIED POSTPROCEDURAL STATES: Chronic | ICD-10-CM

## 2023-06-30 DIAGNOSIS — K40.90 UNILATERAL INGUINAL HERNIA, WITHOUT OBSTRUCTION OR GANGRENE, NOT SPECIFIED AS RECURRENT: ICD-10-CM

## 2023-06-30 DIAGNOSIS — Z90.49 ACQUIRED ABSENCE OF OTHER SPECIFIED PARTS OF DIGESTIVE TRACT: Chronic | ICD-10-CM

## 2023-06-30 LAB
APPEARANCE UR: CLEAR — SIGNIFICANT CHANGE UP
APTT BLD: 31.8 SEC — SIGNIFICANT CHANGE UP (ref 27–39.2)
BILIRUB UR-MCNC: NEGATIVE — SIGNIFICANT CHANGE UP
COLOR SPEC: YELLOW — SIGNIFICANT CHANGE UP
DIFF PNL FLD: NEGATIVE — SIGNIFICANT CHANGE UP
GLUCOSE UR QL: NEGATIVE — SIGNIFICANT CHANGE UP
INR BLD: 1.12 RATIO — SIGNIFICANT CHANGE UP (ref 0.65–1.3)
KETONES UR-MCNC: NEGATIVE — SIGNIFICANT CHANGE UP
LEUKOCYTE ESTERASE UR-ACNC: NEGATIVE — SIGNIFICANT CHANGE UP
NITRITE UR-MCNC: NEGATIVE — SIGNIFICANT CHANGE UP
PH UR: 7.5 — SIGNIFICANT CHANGE UP (ref 5–8)
PROT UR-MCNC: SIGNIFICANT CHANGE UP
PROTHROM AB SERPL-ACNC: 12.8 SEC — SIGNIFICANT CHANGE UP (ref 9.95–12.87)
SP GR SPEC: 1.02 — SIGNIFICANT CHANGE UP (ref 1.01–1.03)
UROBILINOGEN FLD QL: SIGNIFICANT CHANGE UP

## 2023-06-30 PROCEDURE — 85730 THROMBOPLASTIN TIME PARTIAL: CPT

## 2023-06-30 PROCEDURE — 93010 ELECTROCARDIOGRAM REPORT: CPT

## 2023-06-30 PROCEDURE — 36415 COLL VENOUS BLD VENIPUNCTURE: CPT

## 2023-06-30 PROCEDURE — 99214 OFFICE O/P EST MOD 30 MIN: CPT | Mod: 25

## 2023-06-30 PROCEDURE — 81003 URINALYSIS AUTO W/O SCOPE: CPT

## 2023-06-30 PROCEDURE — 85610 PROTHROMBIN TIME: CPT

## 2023-06-30 PROCEDURE — 93005 ELECTROCARDIOGRAM TRACING: CPT

## 2023-06-30 RX ORDER — AMLODIPINE BESYLATE 2.5 MG/1
1 TABLET ORAL
Qty: 0 | Refills: 0 | DISCHARGE

## 2023-06-30 RX ORDER — METFORMIN HYDROCHLORIDE 850 MG/1
1 TABLET ORAL
Qty: 0 | Refills: 0 | DISCHARGE

## 2023-06-30 RX ORDER — GLUCOSAMINE HCL/CHONDROITIN SU 500-400 MG
0 CAPSULE ORAL
Qty: 0 | Refills: 0 | DISCHARGE

## 2023-06-30 RX ORDER — DOXAZOSIN MESYLATE 4 MG
1 TABLET ORAL
Qty: 0 | Refills: 0 | DISCHARGE

## 2023-06-30 RX ORDER — GLIMEPIRIDE 1 MG
1 TABLET ORAL
Qty: 0 | Refills: 0 | DISCHARGE

## 2023-06-30 RX ORDER — ERGOCALCIFEROL 1.25 MG/1
1 CAPSULE ORAL
Qty: 0 | Refills: 0 | DISCHARGE

## 2023-06-30 NOTE — H&P PST ADULT - HISTORY OF PRESENT ILLNESS
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   Patient denies any cp, sob, palpitations, fever, cough, URI, abdominal pains, N/V, UTI, Rashes or open wounds.  As per patient exercise tolerance of 1-2 fos walks with out sob  Patient denies any s/s covid 19 and reports no contact with known positive people. Patient instructed to continue to self monitor and report any concerns to MD. Pt will continue to practice self isolation and  exposure control measures pre op  Anesthesia Alert  NO--Difficult Airway  NO--History of neck surgery or radiation  NO--Limited ROM of neck  NO--History of Malignant hyperthermia  NO--Personal or family history of Pseudocholinesterase deficiency  NO--Prior Anesthesia Complication  NO--Latex Allergy  NO--Loose teeth  NO--History of Rheumatoid Arthritis  NO--DEONNA  NO--Risk of Bleedings   Pt instructed to stop vitamins/supplements/herbal medications for one week prior to surgery  As per patient this is the complete medical, surgical history and medications.

## 2023-06-30 NOTE — H&P PST ADULT - NSANTHOSAYNRD_GEN_A_CORE
77 yo  M presents to ED A+OX3 c/o chest pain. Reports pain began the other day, states "I picked up my dog and was walking and the chest pain began and hasn't gone away." Pt. reports the pain today is midsternal, non-radiating and constant. Reports the tingling to both hands. Denies left arm pain, N/V, back pain, neck pain, SOB, abdominal pain, fever, chills, palpitations. EKG completed, placed on CM. Breathing unlabored on RA, lung sounds clear. Skin warm pink and dry. Abdomen soft and nontender. Reports taking x3  325mg of Aspirin. Moves all extremities. Comfort measures in place.
Yes

## 2023-06-30 NOTE — H&P PST ADULT - NSICDXPASTMEDICALHX_GEN_ALL_CORE_FT
PAST MEDICAL HISTORY:  Aortic stenosis     Atrial fibrillation and flutter     AV block, Mobitz 1     Micky disease     GERD (gastroesophageal reflux disease)     H/O: HTN (hypertension)     History of morbid obesity     DEONNA (obstructive sleep apnea)     RBBB

## 2023-06-30 NOTE — H&P PST ADULT - REASON FOR ADMISSION
Case Type: OP  [address]: [address]  Proceduralist: Mahesh Frank  Confirmed Surgery Date Time: 07-  PAST Date Time: 06- - 15:00  Procedure: OPEN RIGHT INGUINAL HERNIA REPAIR WITH MESH  Laterality: Right  Length of Procedure: 90 Minutes  Anesthesia Type: Local Standby

## 2023-07-01 DIAGNOSIS — Z01.818 ENCOUNTER FOR OTHER PREPROCEDURAL EXAMINATION: ICD-10-CM

## 2023-07-01 DIAGNOSIS — K40.90 UNILATERAL INGUINAL HERNIA, WITHOUT OBSTRUCTION OR GANGRENE, NOT SPECIFIED AS RECURRENT: ICD-10-CM

## 2023-07-03 ENCOUNTER — APPOINTMENT (OUTPATIENT)
Dept: CARDIOLOGY | Facility: CLINIC | Age: 73
End: 2023-07-03
Payer: MEDICARE

## 2023-07-03 VITALS — TEMPERATURE: 97.6 F | BODY MASS INDEX: 25.62 KG/M2 | HEIGHT: 71 IN | WEIGHT: 183 LBS

## 2023-07-03 VITALS — DIASTOLIC BLOOD PRESSURE: 66 MMHG | HEART RATE: 55 BPM | SYSTOLIC BLOOD PRESSURE: 122 MMHG

## 2023-07-03 PROBLEM — N49.3 FOURNIER GANGRENE: Chronic | Status: ACTIVE | Noted: 2023-06-30

## 2023-07-03 PROCEDURE — 99214 OFFICE O/P EST MOD 30 MIN: CPT | Mod: 25

## 2023-07-03 PROCEDURE — 93000 ELECTROCARDIOGRAM COMPLETE: CPT

## 2023-07-03 NOTE — ASSESSMENT
[FreeTextEntry1] : The patient had two syncopal episodes at home . He has suspected conduction disease with a marked first degree AV block and RBBB . He had an ILM in the past . He was seen in the ER but did not have much in the way of a cardiac work up . The ILM has been removed . The patient has DEONNA which is not being treated . He has althogh lost a significant amount of weight seconday to Celiac diseae .

## 2023-07-03 NOTE — CARDIOLOGY SUMMARY
[___] : [unfilled] [LVEF ___%] : LVEF [unfilled]% [None] : normal LV function [de-identified] : 6-1-2022 NSR with marked first degree AV block PAC RBBB \par 1- NSR first degree AV block RBBB LAFB \par 7-3-2023 Sinus Bradycardia First degree AV block RBBB  [de-identified] : 11- EF 55-60% mild MR mild TR   mild AS Ascending aorta was 3.8 cm

## 2023-07-03 NOTE — HISTORY OF PRESENT ILLNESS
[FreeTextEntry1] : The patient had syncope last month . He was walking upstairs and felt lightheaded and had a syncopal episode . The patient had a secoind episode right after the first . He was seen in the ER . Despite having the abnormal ECG they had attibuted this to being dehydrated . In the past he had an ILM and was followed by EP . Has inguinal hernia which need repair  .

## 2023-07-05 ENCOUNTER — INPATIENT (INPATIENT)
Facility: HOSPITAL | Age: 73
LOS: 1 days | Discharge: HOME HEALTH | DRG: 244 | End: 2023-07-07
Attending: INTERNAL MEDICINE | Admitting: HOSPITALIST
Payer: MEDICARE

## 2023-07-05 VITALS
WEIGHT: 182.98 LBS | OXYGEN SATURATION: 100 % | TEMPERATURE: 98 F | HEIGHT: 71 IN | RESPIRATION RATE: 20 BRPM | HEART RATE: 77 BPM | DIASTOLIC BLOOD PRESSURE: 67 MMHG | SYSTOLIC BLOOD PRESSURE: 136 MMHG

## 2023-07-05 DIAGNOSIS — Z98.890 OTHER SPECIFIED POSTPROCEDURAL STATES: Chronic | ICD-10-CM

## 2023-07-05 DIAGNOSIS — Z90.49 ACQUIRED ABSENCE OF OTHER SPECIFIED PARTS OF DIGESTIVE TRACT: Chronic | ICD-10-CM

## 2023-07-05 PROCEDURE — 99285 EMERGENCY DEPT VISIT HI MDM: CPT

## 2023-07-05 PROCEDURE — 93010 ELECTROCARDIOGRAM REPORT: CPT

## 2023-07-05 NOTE — ED ADULT NURSE NOTE - ALCOHOL PRE SCREEN (AUDIT - C)
Veronica Gaston had an appt on 3/5 and was told by Dr. Karissa Peng that she needed a probe done for her esophagus and she needs the phone and who she needs to call. Please call Veronica Gaston back; thank you. Statement Selected

## 2023-07-05 NOTE — ED ADULT NURSE NOTE - SUICIDE SCREENING QUESTION 1
No Detail Level: Detailed Note Text (......Xxx Chief Complaint.): This diagnosis correlates with the Render Risk Assessment In Note?: no Other (Free Text): Recheck in 6 months

## 2023-07-05 NOTE — ED ADULT NURSE NOTE - NSFALLUNIVINTERV_ED_ALL_ED
Bed/Stretcher in lowest position, wheels locked, appropriate side rails in place/Call bell, personal items and telephone in reach/Instruct patient to call for assistance before getting out of bed/chair/stretcher/Non-slip footwear applied when patient is off stretcher/[place] to call system/Physically safe environment - no spills, clutter or unnecessary equipment/Purposeful proactive rounding/Room/bathroom lighting operational, light cord in reach

## 2023-07-05 NOTE — ED ADULT NURSE NOTE - OBJECTIVE STATEMENT
PT presents sp called by cardiologist for "my heart keeps stopping" pt denies cp/ob. pt reports h has a holter monitor since Monday reports he is unaware when it happens and "I am not worried about this" pt hr 62 nsr at this time ekg done in triage

## 2023-07-05 NOTE — ED ADULT TRIAGE NOTE - CHIEF COMPLAINT QUOTE
sent in for pacemaker placement, wearing holter monitor since monday. reports he had syncopal episode x 2 weeks ago

## 2023-07-06 DIAGNOSIS — R94.31 ABNORMAL ELECTROCARDIOGRAM [ECG] [EKG]: ICD-10-CM

## 2023-07-06 LAB
ALBUMIN SERPL ELPH-MCNC: 4.1 G/DL — SIGNIFICANT CHANGE UP (ref 3.5–5.2)
ALP SERPL-CCNC: 107 U/L — SIGNIFICANT CHANGE UP (ref 30–115)
ALT FLD-CCNC: 9 U/L — SIGNIFICANT CHANGE UP (ref 0–41)
ANION GAP SERPL CALC-SCNC: 9 MMOL/L — SIGNIFICANT CHANGE UP (ref 7–14)
APTT BLD: 31 SEC — SIGNIFICANT CHANGE UP (ref 27–39.2)
AST SERPL-CCNC: 12 U/L — SIGNIFICANT CHANGE UP (ref 0–41)
BASOPHILS # BLD AUTO: 0.05 K/UL — SIGNIFICANT CHANGE UP (ref 0–0.2)
BASOPHILS NFR BLD AUTO: 0.5 % — SIGNIFICANT CHANGE UP (ref 0–1)
BILIRUB SERPL-MCNC: 0.8 MG/DL — SIGNIFICANT CHANGE UP (ref 0.2–1.2)
BUN SERPL-MCNC: 19 MG/DL — SIGNIFICANT CHANGE UP (ref 10–20)
CALCIUM SERPL-MCNC: 9.4 MG/DL — SIGNIFICANT CHANGE UP (ref 8.4–10.5)
CHLORIDE SERPL-SCNC: 106 MMOL/L — SIGNIFICANT CHANGE UP (ref 98–110)
CO2 SERPL-SCNC: 26 MMOL/L — SIGNIFICANT CHANGE UP (ref 17–32)
CREAT SERPL-MCNC: 0.8 MG/DL — SIGNIFICANT CHANGE UP (ref 0.7–1.5)
EGFR: 95 ML/MIN/1.73M2 — SIGNIFICANT CHANGE UP
EOSINOPHIL # BLD AUTO: 0.67 K/UL — SIGNIFICANT CHANGE UP (ref 0–0.7)
EOSINOPHIL NFR BLD AUTO: 6.8 % — SIGNIFICANT CHANGE UP (ref 0–8)
GLUCOSE BLDC GLUCOMTR-MCNC: 161 MG/DL — HIGH (ref 70–99)
GLUCOSE BLDC GLUCOMTR-MCNC: 86 MG/DL — SIGNIFICANT CHANGE UP (ref 70–99)
GLUCOSE BLDC GLUCOMTR-MCNC: 87 MG/DL — SIGNIFICANT CHANGE UP (ref 70–99)
GLUCOSE BLDC GLUCOMTR-MCNC: 88 MG/DL — SIGNIFICANT CHANGE UP (ref 70–99)
GLUCOSE SERPL-MCNC: 139 MG/DL — HIGH (ref 70–99)
HCT VFR BLD CALC: 39.7 % — LOW (ref 42–52)
HGB BLD-MCNC: 13.6 G/DL — LOW (ref 14–18)
IMM GRANULOCYTES NFR BLD AUTO: 0.3 % — SIGNIFICANT CHANGE UP (ref 0.1–0.3)
INR BLD: 1.15 RATIO — SIGNIFICANT CHANGE UP (ref 0.65–1.3)
LYMPHOCYTES # BLD AUTO: 2.58 K/UL — SIGNIFICANT CHANGE UP (ref 1.2–3.4)
LYMPHOCYTES # BLD AUTO: 26.2 % — SIGNIFICANT CHANGE UP (ref 20.5–51.1)
MAGNESIUM SERPL-MCNC: 2.1 MG/DL — SIGNIFICANT CHANGE UP (ref 1.8–2.4)
MCHC RBC-ENTMCNC: 30.2 PG — SIGNIFICANT CHANGE UP (ref 27–31)
MCHC RBC-ENTMCNC: 34.3 G/DL — SIGNIFICANT CHANGE UP (ref 32–37)
MCV RBC AUTO: 88 FL — SIGNIFICANT CHANGE UP (ref 80–94)
MONOCYTES # BLD AUTO: 0.57 K/UL — SIGNIFICANT CHANGE UP (ref 0.1–0.6)
MONOCYTES NFR BLD AUTO: 5.8 % — SIGNIFICANT CHANGE UP (ref 1.7–9.3)
NEUTROPHILS # BLD AUTO: 5.96 K/UL — SIGNIFICANT CHANGE UP (ref 1.4–6.5)
NEUTROPHILS NFR BLD AUTO: 60.4 % — SIGNIFICANT CHANGE UP (ref 42.2–75.2)
NRBC # BLD: 0 /100 WBCS — SIGNIFICANT CHANGE UP (ref 0–0)
PLATELET # BLD AUTO: 223 K/UL — SIGNIFICANT CHANGE UP (ref 130–400)
PMV BLD: 11 FL — HIGH (ref 7.4–10.4)
POTASSIUM SERPL-MCNC: 4.5 MMOL/L — SIGNIFICANT CHANGE UP (ref 3.5–5)
POTASSIUM SERPL-SCNC: 4.5 MMOL/L — SIGNIFICANT CHANGE UP (ref 3.5–5)
PROT SERPL-MCNC: 6 G/DL — SIGNIFICANT CHANGE UP (ref 6–8)
PROTHROM AB SERPL-ACNC: 13.2 SEC — HIGH (ref 9.95–12.87)
RBC # BLD: 4.51 M/UL — LOW (ref 4.7–6.1)
RBC # FLD: 13.8 % — SIGNIFICANT CHANGE UP (ref 11.5–14.5)
SODIUM SERPL-SCNC: 141 MMOL/L — SIGNIFICANT CHANGE UP (ref 135–146)
TROPONIN T SERPL-MCNC: <0.01 NG/ML — SIGNIFICANT CHANGE UP
TSH SERPL-MCNC: 1.26 UIU/ML — SIGNIFICANT CHANGE UP (ref 0.27–4.2)
WBC # BLD: 9.86 K/UL — SIGNIFICANT CHANGE UP (ref 4.8–10.8)
WBC # FLD AUTO: 9.86 K/UL — SIGNIFICANT CHANGE UP (ref 4.8–10.8)

## 2023-07-06 PROCEDURE — 86618 LYME DISEASE ANTIBODY: CPT

## 2023-07-06 PROCEDURE — 82962 GLUCOSE BLOOD TEST: CPT

## 2023-07-06 PROCEDURE — C1898: CPT

## 2023-07-06 PROCEDURE — 36415 COLL VENOUS BLD VENIPUNCTURE: CPT

## 2023-07-06 PROCEDURE — 33208 INSRT HEART PM ATRIAL & VENT: CPT | Mod: KX

## 2023-07-06 PROCEDURE — 71046 X-RAY EXAM CHEST 2 VIEWS: CPT

## 2023-07-06 PROCEDURE — 99223 1ST HOSP IP/OBS HIGH 75: CPT

## 2023-07-06 PROCEDURE — 85025 COMPLETE CBC W/AUTO DIFF WBC: CPT

## 2023-07-06 PROCEDURE — 86803 HEPATITIS C AB TEST: CPT

## 2023-07-06 PROCEDURE — 84443 ASSAY THYROID STIM HORMONE: CPT

## 2023-07-06 PROCEDURE — C1769: CPT

## 2023-07-06 PROCEDURE — 71045 X-RAY EXAM CHEST 1 VIEW: CPT

## 2023-07-06 PROCEDURE — 83036 HEMOGLOBIN GLYCOSYLATED A1C: CPT

## 2023-07-06 PROCEDURE — 99223 1ST HOSP IP/OBS HIGH 75: CPT | Mod: 57

## 2023-07-06 PROCEDURE — 80053 COMPREHEN METABOLIC PANEL: CPT

## 2023-07-06 PROCEDURE — 83735 ASSAY OF MAGNESIUM: CPT

## 2023-07-06 PROCEDURE — 93306 TTE W/DOPPLER COMPLETE: CPT

## 2023-07-06 PROCEDURE — C1892: CPT

## 2023-07-06 PROCEDURE — 71045 X-RAY EXAM CHEST 1 VIEW: CPT | Mod: 26,77

## 2023-07-06 PROCEDURE — 93306 TTE W/DOPPLER COMPLETE: CPT | Mod: 26

## 2023-07-06 PROCEDURE — 87521 HEPATITIS C PROBE&RVRS TRNSC: CPT

## 2023-07-06 PROCEDURE — C1889: CPT

## 2023-07-06 PROCEDURE — C1785: CPT

## 2023-07-06 PROCEDURE — 71045 X-RAY EXAM CHEST 1 VIEW: CPT | Mod: 26

## 2023-07-06 RX ORDER — CEFAZOLIN SODIUM 1 G
1000 VIAL (EA) INJECTION ONCE
Refills: 0 | Status: COMPLETED | OUTPATIENT
Start: 2023-07-06 | End: 2023-07-06

## 2023-07-06 RX ORDER — INSULIN LISPRO 100/ML
VIAL (ML) SUBCUTANEOUS
Refills: 0 | Status: DISCONTINUED | OUTPATIENT
Start: 2023-07-06 | End: 2023-07-07

## 2023-07-06 RX ORDER — DOXAZOSIN MESYLATE 4 MG
2 TABLET ORAL DAILY
Refills: 0 | Status: DISCONTINUED | OUTPATIENT
Start: 2023-07-07 | End: 2023-07-07

## 2023-07-06 RX ORDER — DEXTROSE 50 % IN WATER 50 %
12.5 SYRINGE (ML) INTRAVENOUS ONCE
Refills: 0 | Status: DISCONTINUED | OUTPATIENT
Start: 2023-07-06 | End: 2023-07-07

## 2023-07-06 RX ORDER — SODIUM CHLORIDE 9 MG/ML
1000 INJECTION, SOLUTION INTRAVENOUS
Refills: 0 | Status: DISCONTINUED | OUTPATIENT
Start: 2023-07-06 | End: 2023-07-07

## 2023-07-06 RX ORDER — INSULIN LISPRO 100/ML
VIAL (ML) SUBCUTANEOUS AT BEDTIME
Refills: 0 | Status: DISCONTINUED | OUTPATIENT
Start: 2023-07-06 | End: 2023-07-07

## 2023-07-06 RX ORDER — ATORVASTATIN CALCIUM 80 MG/1
10 TABLET, FILM COATED ORAL AT BEDTIME
Refills: 0 | Status: DISCONTINUED | OUTPATIENT
Start: 2023-07-06 | End: 2023-07-07

## 2023-07-06 RX ORDER — ASPIRIN/CALCIUM CARB/MAGNESIUM 324 MG
81 TABLET ORAL DAILY
Refills: 0 | Status: DISCONTINUED | OUTPATIENT
Start: 2023-07-07 | End: 2023-07-07

## 2023-07-06 RX ORDER — CEFAZOLIN SODIUM 1 G
VIAL (EA) INJECTION
Refills: 0 | Status: COMPLETED | OUTPATIENT
Start: 2023-07-06 | End: 2023-07-07

## 2023-07-06 RX ORDER — DEXTROSE 50 % IN WATER 50 %
25 SYRINGE (ML) INTRAVENOUS ONCE
Refills: 0 | Status: DISCONTINUED | OUTPATIENT
Start: 2023-07-06 | End: 2023-07-07

## 2023-07-06 RX ORDER — GLUCAGON INJECTION, SOLUTION 0.5 MG/.1ML
1 INJECTION, SOLUTION SUBCUTANEOUS ONCE
Refills: 0 | Status: DISCONTINUED | OUTPATIENT
Start: 2023-07-06 | End: 2023-07-07

## 2023-07-06 RX ORDER — AMLODIPINE BESYLATE 2.5 MG/1
10 TABLET ORAL DAILY
Refills: 0 | Status: DISCONTINUED | OUTPATIENT
Start: 2023-07-07 | End: 2023-07-07

## 2023-07-06 RX ORDER — MULTIVIT-MIN/FERROUS GLUCONATE 9 MG/15 ML
1 LIQUID (ML) ORAL
Refills: 0 | DISCHARGE

## 2023-07-06 RX ORDER — PANTOPRAZOLE SODIUM 20 MG/1
40 TABLET, DELAYED RELEASE ORAL
Refills: 0 | Status: DISCONTINUED | OUTPATIENT
Start: 2023-07-06 | End: 2023-07-07

## 2023-07-06 RX ORDER — CEFAZOLIN SODIUM 1 G
1000 VIAL (EA) INJECTION EVERY 8 HOURS
Refills: 0 | Status: COMPLETED | OUTPATIENT
Start: 2023-07-06 | End: 2023-07-06

## 2023-07-06 RX ORDER — DEXTROSE 50 % IN WATER 50 %
15 SYRINGE (ML) INTRAVENOUS ONCE
Refills: 0 | Status: DISCONTINUED | OUTPATIENT
Start: 2023-07-06 | End: 2023-07-07

## 2023-07-06 RX ORDER — CEFAZOLIN SODIUM 1 G
1000 VIAL (EA) INJECTION ONCE
Refills: 0 | Status: COMPLETED | OUTPATIENT
Start: 2023-07-07 | End: 2023-07-07

## 2023-07-06 RX ADMIN — Medication 100 MILLIGRAM(S): at 21:41

## 2023-07-06 RX ADMIN — Medication 0: at 21:38

## 2023-07-06 RX ADMIN — Medication 100 MILLIGRAM(S): at 17:03

## 2023-07-06 RX ADMIN — ATORVASTATIN CALCIUM 10 MILLIGRAM(S): 80 TABLET, FILM COATED ORAL at 21:41

## 2023-07-06 RX ADMIN — Medication 100 MILLIGRAM(S): at 16:05

## 2023-07-06 NOTE — H&P ADULT - HISTORY OF PRESENT ILLNESS
73 y/o M with PMHx of DM, HTN, DEONNA not on CPAP, celiac disease, former smoke presents for eval of abnormal event on cardiac monitor. pt states he was in ed 2 weeks ago for syncope. pt followed up w/ roxy Coyne and pt was set up with holter. pt states he was called by company because he had long pauses. on checking VS, pt also noticed his HR had gone down to the 30s.  no subsequent syncopal events. no cp, sob, fevers. pt was told to come for admission for possible PM.    ED vitals:  /67, HR 77, Temp 98.4F, satting 100%  Labs unremarkable  EKG NSR w/ 2nd degree AV block, RBBB  CXR unremarkable    Admitted for bradycardia. 71 y/o M with PMHx of DM, HTN, DEONNA not on CPAP, celiac disease, former smoke presents for eval of abnormal event on cardiac monitor. Patient was in the ED 2 weeks prior for syncopal episode. pt followed up w/ roxy Coyne and pt was set up with holter. pt states he was called by company because he had long pauses. on checking VS, pt also noticed his HR had gone down to the 30s. Pt was told to come for admission for possible PM. Denies any syncopal events, no fevers, chills, fatigue, chest pain, palpitations, SOB, cough.     ED vitals:  /67, HR 77, Temp 98.4F, satting 100%  Labs unremarkable  EKG NSR w/ 2nd degree AV block, RBBB  CXR unremarkable    Admitted for bradycardia.

## 2023-07-06 NOTE — PRE PROCEDURE NOTE - PRE PROCEDURE EVALUATION
I have personally seen and examined the patient. I agree with the history and physical//consult//progress note, which I have reviewed and noted any changes below.     Pre-op Diagnosis: Syncope, Bifascicular block, Pauses, symptomatic pippa    Procedure: Pacemaker Implant

## 2023-07-06 NOTE — ED PROVIDER NOTE - CLINICAL SUMMARY MEDICAL DECISION MAKING FREE TEXT BOX
Throughout ED observation period, pt remained clinically and hemodynamically stable.  d/w cards fellow David- agrees ekg looks more like first degree avb, pt w/ stable HR and BP  no cards tele beds available. cards recs med tele given no high degree hb, BP stable, no sx and will monitor pt with plan to upgrade if clinical status changes

## 2023-07-06 NOTE — H&P ADULT - ATTENDING COMMENTS
Patient seen and examined independently. Agree with resident note with exceptions. Case discussed with housestaff, nursing and patient    Patient  had an holter monitor placed for further evaluation of his  previous syncopal  episodes.  Patient states he was called by  [employer] saying he had a long pause.  Pt  denies chest pain , sob, headache, dizziness, nausea, Vomiting    T(C): 36.8 (07-06-23 @ 08:51), Max: 36.9 (07-05-23 @ 22:39)  HR: 66 (07-06-23 @ 08:51) (64 - 77)  BP: 155/73 (07-06-23 @ 08:51) (132/69 - 155/73)  RR: 18 (07-06-23 @ 08:51) (18 - 20)  SpO2: 97% (07-06-23 @ 08:51) (96% - 100%)    O/E: awake, alert  HEENT: atraumatic, EOMI  Chest: clear  CVS: S1S2+, no murmur  P/A: soft, BS+  Ext: no edema feet  CNS: awake, alert  All systems reviewed positive finfings as avove      # AV Block-  2nd degree  # H/o paroxysmal afib  - 12 Lead ECG (07.05.23 @ 22:40) >Sinus rhythm with 2nd degree A-V block (Mobitz I) Right bundle branch block  - Troponin T, Serum: <0.01 ng/mL (07.06.23 @ 00:05)  - cardiac monitoring  - F/u TSH,  - F/u 2 D echo  - evaluated by EP : planned for PPM     # Hypertension  - c/w norvasc, doxazocin    # DM type 2- pt not on any meds   - F/u HBA1C  - monitor FS  -Start insulin for FS> 180    # Dyslipidemia  - c/w statin    # GERD  - c/w PPI    # DEONNA  -noncompliant with CPAP    # DVT prophylaxis    # Full code    # Pending: F/u TSH, 2 d echo, PPM placement today  # Discussed plan of care with patient  # Disposition: Home when stable    - Time spent: 77 minutes

## 2023-07-06 NOTE — H&P ADULT - NSICDXPASTMEDICALHX_GEN_ALL_CORE_FT
PAST MEDICAL HISTORY:  Aortic stenosis     Atrial fibrillation and flutter     AV block, Mobitz 1     Micky disease     GERD (gastroesophageal reflux disease)     H/O: HTN (hypertension)     History of morbid obesity     DEONNA (obstructive sleep apnea)     RBBB      PAST MEDICAL HISTORY:  Aortic stenosis     AV block, Mobitz 1     Chronic atrial fibrillation     Micky disease     GERD (gastroesophageal reflux disease)     H/O: HTN (hypertension)     History of morbid obesity     DEONNA (obstructive sleep apnea)     RBBB

## 2023-07-06 NOTE — CONSULT NOTE ADULT - NS ATTEND AMEND GEN_ALL_CORE FT
complex patient  High degree AV block  prior syncope  no reversible causes  We discussed the risks/benefits/alternatives, nature of procedure, and follow up care after device is implanted. We also discussed remote monitoring in details. Patient is in agreement with proceeding with the device implant. We discussed the risks of bleeding, hematoma, injury to vessels and heart, perforation, tamponade, pneumothorax, infection, lead dislodgment, device malfunction, and rare risks of stroke/heart attack/death. Patient expressed understanding of the discussion. I answered all the questions in details.

## 2023-07-06 NOTE — PROGRESS NOTE ADULT - SUBJECTIVE AND OBJECTIVE BOX
Electrophysiology Brief Post-Op Note    I have personally seen and examined the patient.  I agree with the history and physical which I have reviewed and noted any changes below.  07-06-23 @ 17:53    PRE-OP DIAGNOSIS: Syncope, bifascicular block    POST-OP DIAGNOSIS: Syncope, bifascicular block    PROCEDURE: DC PPM Implant    Physician: Stephanie Heart MD  Assistant: None    ESTIMATED BLOOD LOSS:     20  mL    ANESTHESIA TYPE:  [  ] General Anesthesia  [ x ] Sedation  [ x ] Local/Regional    CONDITION  [  ] Critical  [  ] Serious  [  ] Fair  [ x ] Good      SPECIMENS REMOVED (IF APPLICABLE): None    IMPLANTS (IF APPLICABLE): DC PPM Implant ([employer])    FINDINGS: Syncope, bifascicular block    COMPLICATIONS: None    PLAN OF CARE  - Ancef 1g IVq8 h  - Chest X-ray PA now and PA/Lat tomorrow am  - Device interrogation tomorrow in am  - NO HEPARIN PRODUCTS OR LOVENOX   - No anticoagulation unless recommended by EP attending  - Follow up with Dr. Khoa Heart MD   Electrophysiology Attending               Electrophysiology Brief Post-Op Note    I have personally seen and examined the patient.  I agree with the history and physical which I have reviewed and noted any changes below.  07-06-23 @ 1300    PRE-OP DIAGNOSIS: Syncope, bifascicular block    POST-OP DIAGNOSIS: Syncope, bifascicular block    PROCEDURE: DC PPM Implant    Physician: Stephanie Heart MD  Assistant: None    ESTIMATED BLOOD LOSS:     20  mL    ANESTHESIA TYPE:  [  ] General Anesthesia  [ x ] Sedation  [ x ] Local/Regional    CONDITION  [  ] Critical  [  ] Serious  [  ] Fair  [ x ] Good      SPECIMENS REMOVED (IF APPLICABLE): None    IMPLANTS (IF APPLICABLE): DC PPM Implant ([employer])    FINDINGS: Syncope, bifascicular block    COMPLICATIONS: None    PLAN OF CARE  - Ancef 1g IVq8 h  - Chest X-ray PA now and PA/Lat tomorrow am  - Device interrogation tomorrow in am  - NO HEPARIN PRODUCTS OR LOVENOX   - No anticoagulation unless recommended by EP attending  - Follow up with Dr. Khoa Heart MD   Electrophysiology Attending

## 2023-07-06 NOTE — PATIENT PROFILE ADULT - FALL HARM RISK - HARM RISK INTERVENTIONS

## 2023-07-06 NOTE — CONSULT NOTE ADULT - ASSESSMENT
71 y/o M with PMHx of DM, HTN, DEONNA not on CPAP, celiac disease, former smoke presents for eval of abnormal event on cardiac monitor 71 y/o M with PMHx of DM, HTN, DEONNA not on CPAP, celiac disease, former smoke presents for eval of abnormal event on cardiac monitor.  Pt with 3 second pauses and Wenckebach    Second degree AVB Type I  DEONNA, noncompliant with CPAP  DM  HTN    Plan  Recommend PPM.  Pt is in agreement  Plan for PPM today  Maintain NPO EP: Dr. Couch    73 y/o M with PMHx of DM, HTN, DEONNA not on CPAP, celiac disease, former smoke presents for eval of abnormal event on cardiac monitor.  Pt with 3 second pauses and Wenckebach    Second degree AVB Type I  DEONNA, noncompliant with CPAP  DM  HTN    Plan  Recommend PPM.  Pt is in agreement  Plan for PPM today  Maintain NPO EP: Dr. Couch    73 y/o M with PMHx of DM, HTN, DEONNA not on CPAP, celiac disease, former smoke presents for eval of abnormal event on cardiac monitor.  Pt with >3 second pauses due to high degree AV block    High degree AV block  Second degree AVB   DEONNA, noncompliant with CPAP  DM  HTN    Plan  Recommend PPM.  Pt is in agreement  Plan for PPM today  Maintain NPO

## 2023-07-06 NOTE — H&P ADULT - ASSESSMENT
71 y/o M with PMHx of DM, HTN, DEONNA not on CPAP, celiac disease, former smoke presents for eval of abnormal event on cardiac monitor. Pt was set up with holter. pt states he was called by company because he had long pauses. Admitted for bradycardia.    #Bradycardia and sinus pauses  - hemodynamically stable, on room air  - EKG NSR w/ 2nd degree AV block, RBBB  - CXR unremarkable  - holter monitor w/ events of long pauses with bradycardic events  - EP consult for pacemaker  - hold BB  - monitor on tele      Misc:  DVT ppx:  GI ppx:  Diet:  Activity: IAT  Code: Full Code  Dispo: Acute, tele   71 y/o M with PMHx of DM, HTN, DEONNA not on CPAP, celiac disease, former smoke presents for eval of abnormal event on cardiac monitor. Patient was set up with a Holter monitor after a previous syncopal event. Patient states he was called by company because he had long pauses. Admitted for bradycardia.    #Bradycardia and sinus pauses  - hemodynamically stable, on room air  - EKG NSR w/ 2nd degree AV block, RBBB  - CXR unremarkable  - holter monitor w/ events of long pauses with bradycardic events  - EP consult for pacemaker  - monitor on tele  - avoid AV patrick blockers  - check TSH, lyme titers    #DM  - monitor FS    #HTN  - continue amlodipine 10mg daily and doxazosin 2mg daily     #HLD  - continue atorvastatin 10mg bedtime    Misc:  DVT ppx: lovenox  GI ppx: PPI  Diet: DASH/TLC  Activity: IAT  Code: Full Code  Dispo: Acute, tele   73 y/o M with PMHx of DM, HTN, DEONNA not on CPAP, celiac disease, former smoke presents for eval of abnormal event on cardiac monitor. Patient was set up with a Holter monitor after a previous syncopal event. Patient states he was called by company because he had long pauses. Admitted for bradycardia.    #Bradycardia and sinus pauses  - hemodynamically stable, on room air  - EKG NSR w/ 2nd degree AV block, RBBB  - CXR unremarkable  - holter monitor w/ events of long pauses with bradycardic events  - EP consult for pacemaker  - monitor on tele  - avoid AV patrick blockers  - check TSH, lyme titers  - check TTE    #DM  - monitor FS    #HTN  - continue amlodipine 10mg daily and doxazosin 2mg daily     #HLD  - continue atorvastatin 10mg bedtime    Misc:  DVT ppx: lovenox  GI ppx: PPI  Diet: DASH/TLC  Activity: IAT  Code: Full Code  Dispo: Acute, tele

## 2023-07-06 NOTE — ED PROVIDER NOTE - PHYSICAL EXAMINATION
vss  gen- NAD, aaox3  card-rrr  lungs-ctab, no wheezing or rhonchi  abd-sntnd, no guarding or rebound  neuro- full str/sensation, cn ii-xii grossly intact, normal coordination

## 2023-07-06 NOTE — H&P ADULT - NSHPPHYSICALEXAM_GEN_ALL_CORE
VITALS:   T(C): 36.8 (07-06-23 @ 08:51), Max: 36.9 (07-05-23 @ 22:39)  HR: 66 (07-06-23 @ 08:51) (64 - 77)  BP: 155/73 (07-06-23 @ 08:51) (132/69 - 155/73)  RR: 18 (07-06-23 @ 08:51) (18 - 20)  SpO2: 97% (07-06-23 @ 08:51) (96% - 100%)    GENERAL: NAD, lying in bed comfortably  HEAD:  Atraumatic, normocephalic  EYES: EOMI, PERRLA, conjunctiva and sclera clear  ENT: Moist mucous membranes  NECK: Supple, no JVD  HEART: Regular rate and rhythm, no murmurs, rubs, or gallops  LUNGS: Unlabored respirations.  Clear to auscultation bilaterally, no crackles, wheezing, or rhonchi  ABDOMEN: Soft, nontender, nondistended, +BS  EXTREMITIES: 2+ peripheral pulses bilaterally. No clubbing, cyanosis, or edema  NERVOUS SYSTEM:  A&Ox3, no focal deficits   SKIN: No rashes or lesions GENERAL: NAD, lying in bed comfortably  HEAD:  Atraumatic, normocephalic  EYES: EOMI, PERRLA, conjunctiva and sclera clear  ENT: Moist mucous membranes  NECK: Supple, no JVD  HEART: Regular rate and rhythm, no murmurs, rubs, or gallops  LUNGS: Unlabored respirations.  Clear to auscultation bilaterally, no crackles, wheezing, or rhonchi  ABDOMEN: Soft, nontender, nondistended, +BS  EXTREMITIES: 2+ peripheral pulses bilaterally. No clubbing, cyanosis, or edema  NERVOUS SYSTEM:  A&Ox3, no focal deficits   SKIN: No rashes or lesions

## 2023-07-06 NOTE — CONSULT NOTE ADULT - SUBJECTIVE AND OBJECTIVE BOX
Patient is a 72y old  Male who presents with a chief complaint of bradycardia (06 Jul 2023 09:38)  HPI:  71 y/o M with PMHx of DM, HTN, DEONNA not on CPAP, celiac disease, former smoke presents for eval of abnormal event on cardiac monitor. Patient was in the ED 2 weeks prior for syncopal episode. pt followed up w/ roxy Coyne and pt was set up with holter. pt states he was called by company because he had long pauses. on checking VS, pt also noticed his HR had gone down to the 30s. Pt was told to come for admission for possible PM. Denies any syncopal events, no fevers, chills, fatigue, chest pain, palpitations, SOB, cough.     ED vitals:  /67, HR 77, Temp 98.4F, satting 100%  Labs unremarkable  EKG NSR w/ 2nd degree AV block, RBBB  CXR unremarkable    EP;  Pt states he was called by [facility] saying he had a long pause.  Pt states he was sleeping during the episode.  Pt states his heart has been stopping for short periods since he was 7 years old.  He said his father had the same thing.  Pt previously had a loop recorder which was removed several years ago.  He follows with Dr. Couch.  Pt has been told for several years that he will eventually need a PPM.  Pt had a syncopal episode 2 weeks ago.  He was walking up the stairs and passed out when he got to the top.  Dr. Coyne placed and MCOT on him after that episode.    AYDE reviewed - most of episodes occur at night while pt is sleeping and not wearing CPAP. Pt did have a 3 second pause on 7/4 at 7:32 pm.    PAST MEDICAL & SURGICAL HISTORY:  History of morbid obesity    DEONNA (obstructive sleep apnea)    AV block, Mobitz 1    RBBB    H/O: HTN (hypertension)    GERD (gastroesophageal reflux disease)    Aortic stenosis    Micky disease    Chronic atrial fibrillation    S/P cholecystectomy    S/P debridement    PREVIOUS DIAGNOSTIC TESTING:      ECHO  FINDINGS:  none    MEDICATIONS  (STANDING):  atorvastatin 10 milliGRAM(s) Oral at bedtime  dextrose 5%. 1000 milliLiter(s) (50 mL/Hr) IV Continuous <Continuous>  dextrose 5%. 1000 milliLiter(s) (100 mL/Hr) IV Continuous <Continuous>  dextrose 50% Injectable 25 Gram(s) IV Push once  dextrose 50% Injectable 12.5 Gram(s) IV Push once  dextrose 50% Injectable 25 Gram(s) IV Push once  glucagon  Injectable 1 milliGRAM(s) IntraMuscular once  insulin lispro (ADMELOG) corrective regimen sliding scale   SubCutaneous at bedtime  insulin lispro (ADMELOG) corrective regimen sliding scale   SubCutaneous three times a day before meals  pantoprazole    Tablet 40 milliGRAM(s) Oral before breakfast    MEDICATIONS  (PRN):  dextrose Oral Gel 15 Gram(s) Oral once PRN Blood Glucose LESS THAN 70 milliGRAM(s)/deciliter      FAMILY HISTORY:  FH: diabetes mellitus    SOCIAL HISTORY:    CIGARETTES: quit 30 years ago    ALCOHOL: drinks 1-2 white claws/day    Past Surgical History:    Allergies:    No Known Allergies      REVIEW OF SYSTEMS:    CONSTITUTIONAL: No fever, weight loss, chills, shakes, or fatigue  EYES: No eye pain, visual disturbances, or discharge  ENMT:  No difficulty hearing, tinnitus, vertigo; No sinus or throat pain  NECK: No pain or stiffness  BREASTS: No pain, masses, or nipple discharge  RESPIRATORY: No cough, wheezing, hemoptysis, or shortness of breath  CARDIOVASCULAR: No chest pain, dyspnea, palpitations, dizziness, syncope, paroxysmal nocturnal dyspnea, orthopnea, or arm or leg swelling  GASTROINTESTINAL: No abdominal  or epigastric pain, nausea, vomiting, hematemesis, diarrhea, constipation, melena or bright red blood.  GENITOURINARY: No dysuria, nocturia, hematuria, or urinary incontinence  NEUROLOGICAL: No headaches, memory loss, slurred speech, limb weakness, loss of strength, numbness, or tremors  SKIN: No itching, burning, rashes, or lesions   LYMPH NODES: No enlarged glands  ENDOCRINE: No heat or cold intolerance, or hair loss  MUSCULOSKELETAL: No joint pain or swelling, muscle, back, or extremity pain  PSYCHIATRIC: No depression, anxiety, or difficulty sleeping  HEME/LYMPH: No easy bruising or bleeding gums  ALLERY AND IMMUNOLOGIC: No hives or rash.      Vital Signs Last 24 Hrs  T(C): 36.8 (06 Jul 2023 08:51), Max: 36.9 (05 Jul 2023 22:39)  T(F): 98.3 (06 Jul 2023 08:51), Max: 98.4 (05 Jul 2023 22:39)  HR: 66 (06 Jul 2023 08:51) (64 - 77)  BP: 155/73 (06 Jul 2023 08:51) (132/69 - 155/73)  BP(mean): 94 (06 Jul 2023 05:57) (94 - 94)  RR: 18 (06 Jul 2023 08:51) (18 - 20)  SpO2: 97% (06 Jul 2023 08:51) (96% - 100%)    Parameters below as of 06 Jul 2023 08:51  Patient On (Oxygen Delivery Method): room air    PHYSICAL EXAM:    GENERAL: In no apparent distress, well nourished, and hydrated.  HEART: Regular rate and rhythm; No murmurs, rubs, or gallops.  PULMONARY: Clear to auscultation and perfusion.  No rales, wheezing, or rhonchi bilaterally.  ABDOMEN: Soft, Nontender, Nondistended; Bowel sounds present  EXTREMITIES:  2+ Peripheral Pulses, No clubbing, cyanosis, or edema  NEUROLOGICAL: Grossly nonfocal    INTERPRETATION OF TELEMETRY:  second degree heart block Mobitz type I    ECG:  < from: 12 Lead ECG (07.05.23 @ 22:40) >  Ventricular Rate 67 BPM    Atrial Rate 73 BPM    QRS Duration 136 ms    Q-T Interval 394 ms    QTC Calculation(Bazett) 416 ms    P Axis 232 degrees    R Axis 34 degrees    T Axis 55 degrees    Diagnosis Line Sinus rhythm with 2nd degree A-V block (Mobitz I)  Right bundle branch block  Abnormal ECG    < end of copied text >        LABS:                        13.6   9.86  )-----------( 223      ( 06 Jul 2023 00:05 )             39.7     07-06    141  |  106  |  19  ----------------------------<  139<H>  4.5   |  26  |  0.8    Ca    9.4      06 Jul 2023 00:05  Mg     2.1     07-06    TPro  6.0  /  Alb  4.1  /  TBili  0.8  /  DBili  x   /  AST  12  /  ALT  9   /  AlkPhos  107  07-06    CARDIAC MARKERS ( 06 Jul 2023 00:05 )  x     / <0.01 ng/mL / x     / x     / x          PT/INR - ( 06 Jul 2023 00:05 )   PT: 13.20 sec;   INR: 1.15 ratio         PTT - ( 06 Jul 2023 00:05 )  PTT:31.0 sec  Urinalysis Basic - ( 06 Jul 2023 00:05 )    Color: x / Appearance: x / SG: x / pH: x  Gluc: 139 mg/dL / Ketone: x  / Bili: x / Urobili: x   Blood: x / Protein: x / Nitrite: x   Leuk Esterase: x / RBC: x / WBC x   Sq Epi: x / Non Sq Epi: x / Bacteria: x    BNP    RADIOLOGY & ADDITIONAL STUDIES: Patient is a 72y old  Male who presents with a chief complaint of bradycardia (06 Jul 2023 09:38)  HPI:  73 y/o M with PMHx of DM, HTN, DEONNA not on CPAP, celiac disease, former smoke presents for eval of abnormal event on cardiac monitor. Patient was in the ED 2 weeks prior for syncopal episode. pt followed up w/ roxy Coyne and pt was set up with holter. pt states he was called by company because he had long pauses. on checking VS, pt also noticed his HR had gone down to the 30s. Pt was told to come for admission for possible PM. Denies any syncopal events, no fevers, chills, fatigue, chest pain, palpitations, SOB, cough.     ED vitals:  /67, HR 77, Temp 98.4F, satting 100%  Labs unremarkable  EKG NSR w/ 2nd degree AV block, RBBB  CXR unremarkable    EP;  Pt states he was called by [employer] saying he had a long pause.  Pt states he was sleeping during the episode.  Pt states his heart has been stopping for short periods since he was 7 years old.  He said his father had the same thing.  Pt previously had a loop recorder which was removed several years ago.  He follows with Dr. Couch.  Pt has been told for several years that he will eventually need a PPM.  Pt had a syncopal episode 2 weeks ago.  He was walking up the stairs and passed out when he got to the top.  Dr. Coyne placed and MCOT on him after that episode.    AYDE reviewed - most of episodes occur at night while pt is sleeping and not wearing CPAP. One episode occurred during awake time on 7/4/2023 at 7:32 pm: >3 second pauses due to high degree AV block (non-conducted P waves)    PAST MEDICAL & SURGICAL HISTORY:  History of morbid obesity    DEONNA (obstructive sleep apnea)    AV block, Mobitz 1    RBBB    H/O: HTN (hypertension)    GERD (gastroesophageal reflux disease)    Aortic stenosis    Micky disease    Chronic atrial fibrillation    S/P cholecystectomy    S/P debridement    PREVIOUS DIAGNOSTIC TESTING:      ECHO  FINDINGS:  none    MEDICATIONS  (STANDING):  atorvastatin 10 milliGRAM(s) Oral at bedtime  dextrose 5%. 1000 milliLiter(s) (50 mL/Hr) IV Continuous <Continuous>  dextrose 5%. 1000 milliLiter(s) (100 mL/Hr) IV Continuous <Continuous>  dextrose 50% Injectable 25 Gram(s) IV Push once  dextrose 50% Injectable 12.5 Gram(s) IV Push once  dextrose 50% Injectable 25 Gram(s) IV Push once  glucagon  Injectable 1 milliGRAM(s) IntraMuscular once  insulin lispro (ADMELOG) corrective regimen sliding scale   SubCutaneous at bedtime  insulin lispro (ADMELOG) corrective regimen sliding scale   SubCutaneous three times a day before meals  pantoprazole    Tablet 40 milliGRAM(s) Oral before breakfast    MEDICATIONS  (PRN):  dextrose Oral Gel 15 Gram(s) Oral once PRN Blood Glucose LESS THAN 70 milliGRAM(s)/deciliter      FAMILY HISTORY:  FH: diabetes mellitus    SOCIAL HISTORY:    CIGARETTES: quit 30 years ago    ALCOHOL: drinks 1-2 white claws/day    Past Surgical History:    Allergies:    No Known Allergies      REVIEW OF SYSTEMS:    CONSTITUTIONAL: No fever, weight loss, chills, shakes, or fatigue  EYES: No eye pain, visual disturbances, or discharge  ENMT:  No difficulty hearing, tinnitus, vertigo; No sinus or throat pain  NECK: No pain or stiffness  BREASTS: No pain, masses, or nipple discharge  RESPIRATORY: No cough, wheezing, hemoptysis, or shortness of breath  CARDIOVASCULAR: No chest pain, dyspnea, palpitations, dizziness, syncope, paroxysmal nocturnal dyspnea, orthopnea, or arm or leg swelling  GASTROINTESTINAL: No abdominal  or epigastric pain, nausea, vomiting, hematemesis, diarrhea, constipation, melena or bright red blood.  GENITOURINARY: No dysuria, nocturia, hematuria, or urinary incontinence  NEUROLOGICAL: No headaches, memory loss, slurred speech, limb weakness, loss of strength, numbness, or tremors  SKIN: No itching, burning, rashes, or lesions   LYMPH NODES: No enlarged glands  ENDOCRINE: No heat or cold intolerance, or hair loss  MUSCULOSKELETAL: No joint pain or swelling, muscle, back, or extremity pain  PSYCHIATRIC: No depression, anxiety, or difficulty sleeping  HEME/LYMPH: No easy bruising or bleeding gums  ALLERY AND IMMUNOLOGIC: No hives or rash.      Vital Signs Last 24 Hrs  T(C): 36.8 (06 Jul 2023 08:51), Max: 36.9 (05 Jul 2023 22:39)  T(F): 98.3 (06 Jul 2023 08:51), Max: 98.4 (05 Jul 2023 22:39)  HR: 66 (06 Jul 2023 08:51) (64 - 77)  BP: 155/73 (06 Jul 2023 08:51) (132/69 - 155/73)  BP(mean): 94 (06 Jul 2023 05:57) (94 - 94)  RR: 18 (06 Jul 2023 08:51) (18 - 20)  SpO2: 97% (06 Jul 2023 08:51) (96% - 100%)    Parameters below as of 06 Jul 2023 08:51  Patient On (Oxygen Delivery Method): room air    PHYSICAL EXAM:    GENERAL: In no apparent distress, well nourished, and hydrated.  HEART: Regular rate and rhythm; No murmurs, rubs, or gallops.  PULMONARY: Clear to auscultation and perfusion.  No rales, wheezing, or rhonchi bilaterally.  ABDOMEN: Soft, Nontender, Nondistended; Bowel sounds present  EXTREMITIES:  2+ Peripheral Pulses, No clubbing, cyanosis, or edema  NEUROLOGICAL: Grossly nonfocal    INTERPRETATION OF TELEMETRY:  second degree heart block Mobitz type I    ECG:  < from: 12 Lead ECG (07.05.23 @ 22:40) >  Ventricular Rate 67 BPM    Atrial Rate 73 BPM    QRS Duration 136 ms    Q-T Interval 394 ms    QTC Calculation(Bazett) 416 ms    P Axis 232 degrees    R Axis 34 degrees    T Axis 55 degrees    Diagnosis Line Sinus rhythm with 2nd degree A-V block (Mobitz I)  Right bundle branch block  Abnormal ECG    < end of copied text >        LABS:                        13.6   9.86  )-----------( 223      ( 06 Jul 2023 00:05 )             39.7     07-06    141  |  106  |  19  ----------------------------<  139<H>  4.5   |  26  |  0.8    Ca    9.4      06 Jul 2023 00:05  Mg     2.1     07-06    TPro  6.0  /  Alb  4.1  /  TBili  0.8  /  DBili  x   /  AST  12  /  ALT  9   /  AlkPhos  107  07-06    CARDIAC MARKERS ( 06 Jul 2023 00:05 )  x     / <0.01 ng/mL / x     / x     / x          PT/INR - ( 06 Jul 2023 00:05 )   PT: 13.20 sec;   INR: 1.15 ratio         PTT - ( 06 Jul 2023 00:05 )  PTT:31.0 sec  Urinalysis Basic - ( 06 Jul 2023 00:05 )    Color: x / Appearance: x / SG: x / pH: x  Gluc: 139 mg/dL / Ketone: x  / Bili: x / Urobili: x   Blood: x / Protein: x / Nitrite: x   Leuk Esterase: x / RBC: x / WBC x   Sq Epi: x / Non Sq Epi: x / Bacteria: x    BNP    RADIOLOGY & ADDITIONAL STUDIES:

## 2023-07-06 NOTE — ED PROVIDER NOTE - OBJECTIVE STATEMENT
72-year-old man with a history of diabetes, hypertension, DEONNA not on CPAP, celiac disease, former smoker  pt presents for eval of abn cardiac monitor. pt states he was in ed 2 weeks ago for syncope. pt followed up w/ roxy Coyne and pt was set up with holter. pt states he was called by company because he had long pauses. on checking VS, pt also noticed his HR had gone down to the 30s.  no subsequent syncopal events. no cp, sob, fevers. pt was told to come for admission for possible PM.

## 2023-07-07 ENCOUNTER — TRANSCRIPTION ENCOUNTER (OUTPATIENT)
Age: 73
End: 2023-07-07

## 2023-07-07 VITALS — OXYGEN SATURATION: 97 %

## 2023-07-07 PROBLEM — I48.20 CHRONIC ATRIAL FIBRILLATION, UNSPECIFIED: Chronic | Status: ACTIVE | Noted: 2023-07-06

## 2023-07-07 LAB
A1C WITH ESTIMATED AVERAGE GLUCOSE RESULT: 6.1 % — HIGH (ref 4–5.6)
ALBUMIN SERPL ELPH-MCNC: 4.2 G/DL — SIGNIFICANT CHANGE UP (ref 3.5–5.2)
ALP SERPL-CCNC: 98 U/L — SIGNIFICANT CHANGE UP (ref 30–115)
ALT FLD-CCNC: 8 U/L — SIGNIFICANT CHANGE UP (ref 0–41)
ANION GAP SERPL CALC-SCNC: 11 MMOL/L — SIGNIFICANT CHANGE UP (ref 7–14)
AST SERPL-CCNC: 14 U/L — SIGNIFICANT CHANGE UP (ref 0–41)
B BURGDOR C6 AB SER-ACNC: NEGATIVE — SIGNIFICANT CHANGE UP
B BURGDOR IGG+IGM SER-ACNC: 0.09 INDEX — SIGNIFICANT CHANGE UP (ref 0.01–0.89)
BASOPHILS # BLD AUTO: 0.05 K/UL — SIGNIFICANT CHANGE UP (ref 0–0.2)
BASOPHILS NFR BLD AUTO: 0.5 % — SIGNIFICANT CHANGE UP (ref 0–1)
BILIRUB SERPL-MCNC: 1.7 MG/DL — HIGH (ref 0.2–1.2)
BUN SERPL-MCNC: 13 MG/DL — SIGNIFICANT CHANGE UP (ref 10–20)
CALCIUM SERPL-MCNC: 9.7 MG/DL — SIGNIFICANT CHANGE UP (ref 8.4–10.4)
CHLORIDE SERPL-SCNC: 104 MMOL/L — SIGNIFICANT CHANGE UP (ref 98–110)
CO2 SERPL-SCNC: 25 MMOL/L — SIGNIFICANT CHANGE UP (ref 17–32)
CREAT SERPL-MCNC: 0.8 MG/DL — SIGNIFICANT CHANGE UP (ref 0.7–1.5)
EGFR: 95 ML/MIN/1.73M2 — SIGNIFICANT CHANGE UP
EOSINOPHIL # BLD AUTO: 0.48 K/UL — SIGNIFICANT CHANGE UP (ref 0–0.7)
EOSINOPHIL NFR BLD AUTO: 4.9 % — SIGNIFICANT CHANGE UP (ref 0–8)
ESTIMATED AVERAGE GLUCOSE: 128 MG/DL — HIGH (ref 68–114)
GLUCOSE BLDC GLUCOMTR-MCNC: 108 MG/DL — HIGH (ref 70–99)
GLUCOSE BLDC GLUCOMTR-MCNC: 98 MG/DL — SIGNIFICANT CHANGE UP (ref 70–99)
GLUCOSE SERPL-MCNC: 113 MG/DL — HIGH (ref 70–99)
HCT VFR BLD CALC: 42.3 % — SIGNIFICANT CHANGE UP (ref 42–52)
HCV AB S/CO SERPL IA: 10.55 COI — HIGH
HCV AB SERPL-IMP: REACTIVE
HGB BLD-MCNC: 14.3 G/DL — SIGNIFICANT CHANGE UP (ref 14–18)
IMM GRANULOCYTES NFR BLD AUTO: 0.2 % — SIGNIFICANT CHANGE UP (ref 0.1–0.3)
LYMPHOCYTES # BLD AUTO: 1.7 K/UL — SIGNIFICANT CHANGE UP (ref 1.2–3.4)
LYMPHOCYTES # BLD AUTO: 17.3 % — LOW (ref 20.5–51.1)
MAGNESIUM SERPL-MCNC: 2 MG/DL — SIGNIFICANT CHANGE UP (ref 1.8–2.4)
MCHC RBC-ENTMCNC: 29.6 PG — SIGNIFICANT CHANGE UP (ref 27–31)
MCHC RBC-ENTMCNC: 33.8 G/DL — SIGNIFICANT CHANGE UP (ref 32–37)
MCV RBC AUTO: 87.6 FL — SIGNIFICANT CHANGE UP (ref 80–94)
MONOCYTES # BLD AUTO: 0.56 K/UL — SIGNIFICANT CHANGE UP (ref 0.1–0.6)
MONOCYTES NFR BLD AUTO: 5.7 % — SIGNIFICANT CHANGE UP (ref 1.7–9.3)
NEUTROPHILS # BLD AUTO: 6.99 K/UL — HIGH (ref 1.4–6.5)
NEUTROPHILS NFR BLD AUTO: 71.4 % — SIGNIFICANT CHANGE UP (ref 42.2–75.2)
NRBC # BLD: 0 /100 WBCS — SIGNIFICANT CHANGE UP (ref 0–0)
PLATELET # BLD AUTO: 227 K/UL — SIGNIFICANT CHANGE UP (ref 130–400)
PMV BLD: 11 FL — HIGH (ref 7.4–10.4)
POTASSIUM SERPL-MCNC: 5.1 MMOL/L — HIGH (ref 3.5–5)
POTASSIUM SERPL-SCNC: 5.1 MMOL/L — HIGH (ref 3.5–5)
PROT SERPL-MCNC: 6.4 G/DL — SIGNIFICANT CHANGE UP (ref 6–8)
RBC # BLD: 4.83 M/UL — SIGNIFICANT CHANGE UP (ref 4.7–6.1)
RBC # FLD: 13.5 % — SIGNIFICANT CHANGE UP (ref 11.5–14.5)
SODIUM SERPL-SCNC: 140 MMOL/L — SIGNIFICANT CHANGE UP (ref 135–146)
WBC # BLD: 9.8 K/UL — SIGNIFICANT CHANGE UP (ref 4.8–10.8)
WBC # FLD AUTO: 9.8 K/UL — SIGNIFICANT CHANGE UP (ref 4.8–10.8)

## 2023-07-07 PROCEDURE — 71046 X-RAY EXAM CHEST 2 VIEWS: CPT | Mod: 26

## 2023-07-07 PROCEDURE — 93280 PM DEVICE PROGR EVAL DUAL: CPT | Mod: 26

## 2023-07-07 PROCEDURE — 99239 HOSP IP/OBS DSCHRG MGMT >30: CPT

## 2023-07-07 PROCEDURE — 99024 POSTOP FOLLOW-UP VISIT: CPT

## 2023-07-07 RX ORDER — OMEPRAZOLE 10 MG/1
1 CAPSULE, DELAYED RELEASE ORAL
Qty: 0 | Refills: 0 | DISCHARGE

## 2023-07-07 RX ORDER — AMLODIPINE BESYLATE 2.5 MG/1
1 TABLET ORAL
Refills: 0 | DISCHARGE

## 2023-07-07 RX ORDER — DOXAZOSIN MESYLATE 4 MG
1 TABLET ORAL
Refills: 0 | DISCHARGE

## 2023-07-07 RX ORDER — CHOLECALCIFEROL (VITAMIN D3) 125 MCG
1 CAPSULE ORAL
Refills: 0 | DISCHARGE

## 2023-07-07 RX ORDER — CEPHALEXIN 500 MG
1 CAPSULE ORAL
Qty: 10 | Refills: 0
Start: 2023-07-07 | End: 2023-07-11

## 2023-07-07 RX ORDER — ASPIRIN/CALCIUM CARB/MAGNESIUM 324 MG
1 TABLET ORAL
Qty: 0 | Refills: 0 | DISCHARGE

## 2023-07-07 RX ORDER — SODIUM ZIRCONIUM CYCLOSILICATE 10 G/10G
5 POWDER, FOR SUSPENSION ORAL ONCE
Refills: 0 | Status: COMPLETED | OUTPATIENT
Start: 2023-07-07 | End: 2023-07-07

## 2023-07-07 RX ADMIN — PANTOPRAZOLE SODIUM 40 MILLIGRAM(S): 20 TABLET, DELAYED RELEASE ORAL at 06:14

## 2023-07-07 RX ADMIN — Medication 100 MILLIGRAM(S): at 06:14

## 2023-07-07 RX ADMIN — Medication 2 MILLIGRAM(S): at 06:14

## 2023-07-07 RX ADMIN — SODIUM ZIRCONIUM CYCLOSILICATE 5 GRAM(S): 10 POWDER, FOR SUSPENSION ORAL at 10:37

## 2023-07-07 RX ADMIN — AMLODIPINE BESYLATE 10 MILLIGRAM(S): 2.5 TABLET ORAL at 06:14

## 2023-07-07 NOTE — DISCHARGE NOTE NURSING/CASE MANAGEMENT/SOCIAL WORK - NSDCFUADDAPPT_GEN_ALL_CORE_FT
- Do not lift L arm above shoulder height or lift > 5 lbs for 6 weeks  - Do not get area wet for 3 days  - Leave steri strips in place until they fall off on their own      follow up with your PCP and cardiology/EP as scheduled

## 2023-07-07 NOTE — PROGRESS NOTE ADULT - ASSESSMENT
EP: Dr. Couch    71 y/o M with PMHx of DM, HTN, DEONNA not on CPAP, celiac disease, former smoke presents for eval of abnormal event on cardiac monitor.  Pt with >3 second pauses due to high degree AV block sp DC PPM implant POD#1 and feeling well    Impression:  High degree AV block sp DC PPM ([employer])  Second degree AVB   DEONNA, noncompliant with CPAP  DM  HTN    Plan:  - CXR completed  - Interrogation completed, numbers within appropriate range  - No anticoagulation 48 hours postop  - Start Keflex 500mg BID x 5 days  - Continue all other home medications  - Do not lift L arm above shoulder height or lift > 5 lbs for 6 weeks  - Do not get area wet for 3 days  - Leave steri strips in place until they fall off on their own  - Follow up with Dr Couch in one month

## 2023-07-07 NOTE — DISCHARGE NOTE PROVIDER - ATTENDING DISCHARGE PHYSICAL EXAMINATION:
Patient seen at bedside. s/p PPM placement. patient wants to be discharged. d/w EP cleared for discharge. patient to follow up with PCP and cardiology as shceduled. patient was advised to go to ED in case fo any worsening/new  symptoms. patient understood and agreed with plan.  Patient seen at bedside. s/p PPM placement. patient wants to be discharged. d/w EP cleared for discharge. patient to follow up with PCP and cardiology as shceduled. patient was advised to go to ED in case fo any worsening/new  symptoms. patient understood and agreed with plan.   patient was advised for hep c. patient wants to leave [facility] and does not want any inpatient workup. patient agreed that he is going to follow up with PCP regarding  finding of hep c. patient is completely asymptomatic.

## 2023-07-07 NOTE — DISCHARGE NOTE PROVIDER - NSDCFUADDAPPT_GEN_ALL_CORE_FT
- Do not lift L arm above shoulder height or lift > 5 lbs for 6 weeks  - Do not get area wet for 3 days  - Leave steri strips in place until they fall off on their own   - Do not lift L arm above shoulder height or lift > 5 lbs for 6 weeks  - Do not get area wet for 3 days  - Leave steri strips in place until they fall off on their own      follow up with your PCP and cardiology/EP as scheduled

## 2023-07-07 NOTE — DISCHARGE NOTE PROVIDER - NSDCMRMEDTOKEN_GEN_ALL_CORE_FT
amLODIPine 10 mg oral tablet: 1 orally once a day  aspirin 81 mg oral tablet: 1 tab(s) orally once a day  cholecalciferol 125 mcg (5000 intl units) oral capsule: 1 orally once a day  doxazosin 2 mg oral tablet: 1 orally once a day  Fish oil supplement daily:   omeprazole 40 mg oral delayed release capsule: 1 cap(s) orally once a day  pravastatin 20 mg oral tablet: 1 orally once a day   amLODIPine 10 mg oral tablet: 1 orally once a day  aspirin 81 mg oral tablet: 1 tab(s) orally once a day  cephalexin 500 mg oral tablet: 1 tab(s) orally 2 times a day  cholecalciferol 125 mcg (5000 intl units) oral capsule: 1 orally once a day  doxazosin 2 mg oral tablet: 1 orally once a day  Fish oil supplement daily:   omeprazole 40 mg oral delayed release capsule: 1 cap(s) orally once a day  pravastatin 20 mg oral tablet: 1 orally once a day

## 2023-07-07 NOTE — DISCHARGE NOTE PROVIDER - NSDCFUSCHEDAPPT_GEN_ALL_CORE_FT
Mahesh Frank  [facility] PreAdmits  Scheduled Appointment: 07/13/2023    Alysha Winkler  [facility]  [facility] [address] [facility]  Scheduled Appointment: 08/04/2023

## 2023-07-07 NOTE — DISCHARGE NOTE NURSING/CASE MANAGEMENT/SOCIAL WORK - PATIENT PORTAL LINK FT
You can access the FollowMyHealth Patient Portal offered by [facility] by registering at the following website: [email or web address]. By joining [employer]’s FollowMyHealth portal, you will also be able to view your health information using other applications (apps) compatible with our system.

## 2023-07-07 NOTE — DISCHARGE NOTE PROVIDER - HOSPITAL COURSE
73 y/o M with PMHx of DM, HTN, DEONNA not on CPAP, celiac disease, former smoke presents for eval of abnormal event on cardiac monitor. Patient was in the ED 2 weeks prior for syncopal episode. pt followed up w/ cards Doretha and pt was set up with holter. pt states he was called by company because he had long pauses. on checking VS, pt also noticed his HR had gone down to the 30s. Pt was told to come for admission for possible PM. Denies any syncopal events, no fevers, chills, fatigue, chest pain, palpitations, SOB, cough.     ED vitals:  /67, HR 77, Temp 98.4F, satting 100%  Labs unremarkable  EKG NSR w/ 2nd degree AV block, RBBB  CXR unremarkable    Admitted for bradycardia.    #AV Block-  2nd degree  # H/o paroxysmal afib  - 12 Lead ECG (07.05.23 @ 22:40) >Sinus rhythm with 2nd degree A-V block (Mobitz I) Right bundle branch block  - Troponin T, Serum: <0.01 ng/mL (07.06.23 @ 00:05)  - cardiac monitoring  - F/u TSH,  - F/u 2 D echo  - evaluated by EP : planned for PPM     # Hypertension  - c/w norvasc, doxazocin    # DM type 2- pt not on any meds   - F/u HBA1C  - monitor FS  -Start insulin for FS> 180    # Dyslipidemia  - c/w statin    # GERD  - c/w PPI    # DEONNA  -noncompliant with CPAP 71 y/o M with PMHx of DM, HTN, DEONNA not on CPAP, celiac disease, former smoke presents for eval of abnormal event on cardiac monitor. Patient was in the ED 2 weeks prior for syncopal episode. pt followed up w/ cards Doretha and pt was set up with holter. pt states he was called by company because he had long pauses. on checking VS, pt also noticed his HR had gone down to the 30s. Pt was told to come for admission for possible PM. Denies any syncopal events, no fevers, chills, fatigue, chest pain, palpitations, SOB, cough.     ED vitals:  /67, HR 77, Temp 98.4F, satting 100%  Labs unremarkable  EKG NSR w/ 2nd degree AV block, RBBB  CXR unremarkable    Admitted for bradycardia.    #AV Block-  2nd degree  # H/o paroxysmal afib  - 12 Lead ECG test was done showing sinus rhythm with 2nd degree A-V block (Mobitz I) Right bundle branch block  - Troponin Serum levels collected and were negative with <0.01 ng/mL  - Patient was placed on cardiac monitoring  - TSH level was evaluated and found to be WNL with a value of 1.26.  - 2 D echo displayed:  1. Left ventricular ejection fraction, by visual estimation, is 60 to   65%  2. Impaired relaxation pattern of left ventricular myocardial filling (Grade I diastolic dysfunction)  3. Degenerative mitral valve  4. Severely calcified aortic valve leaflets with reduced motion. Mild aortic stenosis by gradinet. Fusion of the left and non-coronary   leafelet  - Electrophysiology department evaluated the patient, and noticed high degree AV block, Second degree AVB. Due their  findings the placement of a PPM which was placed    # Hypertension  - Patient was given norvasc, doxazocin for BP control    # DM type 2- pt not on any meds   - Blood glucose levels were monitored through FS  - Patient was to be started on insulin if FS was found to be greater than 180, however finger stick was always below 180    # Dyslipidemia  - Patient was on a atorvastatin    # GERD  - Patient was on pantoprazole    # DEONNA  - Patient is not compliant with CPAP 71 y/o M with PMHx of DM, HTN, DEONNA not on CPAP, celiac disease, former smoke presents for eval of abnormal event on cardiac monitor. Patient was in the ED 2 weeks prior for syncopal episode. pt followed up w/ cards Doretha and pt was set up with holter. pt states he was called by company because he had long pauses. on checking VS, pt also noticed his HR had gone down to the 30s. Pt was told to come for admission for possible PM. Denies any syncopal events, no fevers, chills, fatigue, chest pain, palpitations, SOB, cough.     ED vitals:  /67, HR 77, Temp 98.4F, satting 100%  Labs unremarkable  EKG NSR w/ 2nd degree AV block, RBBB  CXR unremarkable    Admitted for bradycardia.    #AV Block-  2nd degree  # H/o paroxysmal afib  - 12 Lead ECG test was done showing sinus rhythm with 2nd degree A-V block (Mobitz I) Right bundle branch block  - Troponin Serum levels collected and were negative with <0.01 ng/mL  - Patient was placed on cardiac monitoring  - TSH level was evaluated and found to be WNL with a value of 1.26.  - 2 D echo displayed:  1. Left ventricular ejection fraction, by visual estimation, is 60 to   65%  2. Impaired relaxation pattern of left ventricular myocardial filling (Grade I diastolic dysfunction)  3. Degenerative mitral valve  4. Severely calcified aortic valve leaflets with reduced motion. Mild aortic stenosis by gradinet. Fusion of the left and non-coronary   leafelet  - Electrophysiology department evaluated the patient, and noticed high degree AV block,   Second degree AVB. Due their  findings the placement of a PPM which was placed    # Hypertension  - Patient was given norvasc, doxazocin for BP control    # DM type 2- pt not on any meds   - Blood glucose levels were monitored through FS  - Patient was to be started on insulin if FS was found to be greater than 180, however finger stick was always below 180    # Dyslipidemia  - Patient was on a atorvastatin    # GERD  - Patient was on pantoprazole    # DEONNA  - Patient is not compliant with CPAP

## 2023-07-07 NOTE — DISCHARGE NOTE PROVIDER - NSDCCPCAREPLAN_GEN_ALL_CORE_FT
PRINCIPAL DISCHARGE DIAGNOSIS  Diagnosis: Abnormal ECG  Assessment and Plan of Treatment: You presented ot the ED for evaluation of an abnormal finding on cardiac monitor and episodes experiecing low heart rates. A test called an electrocardiograpghy (ECG) was done to anylze the elctrical activity in your heart, as well as the rate and rhythem. You were found to have an abnormal ECG showing a low heart rate and sign of blocakage. Electrophysiology saw you upon admission and recommend you get a permaneant pace maker.     PRINCIPAL DISCHARGE DIAGNOSIS  Diagnosis: Abnormal ECG  Assessment and Plan of Treatment: You presented ot the ED for evaluation of an abnormal finding on cardiac monitor and episodes experiecing low heart rates. A test called an electrocardiograpghy (ECG) was done to anylze the elctrical activity in your heart, as well as the rate and rhythem. You were found to have an abnormal ECG showing a low heart rate and sign of blocakage. Electrophysiology saw you upon admission and recommend you get a permaneant pace maker.  Please follow up with EP as OP IN 1 week with Dr Melo.  Please dont take anticoagulants( blood thinners) for 24 hours after the procedure.   - Do not lift L arm above shoulder height or lift > 5 lbs for 6 weeks  - Do not get area wet for 3 days  - Leave steri strips in place until they fall off on their own

## 2023-07-07 NOTE — PROGRESS NOTE ADULT - SUBJECTIVE AND OBJECTIVE BOX
INTERVAL HPI/OVERNIGHT EVENTS:  No acute events overnight  Patient SP DC PPM Implant, POD#1  HD stable and feeling well    MEDICATIONS  (STANDING):  amLODIPine   Tablet 10 milliGRAM(s) Oral daily  aspirin  chewable 81 milliGRAM(s) Oral daily  atorvastatin 10 milliGRAM(s) Oral at bedtime  dextrose 5%. 1000 milliLiter(s) (50 mL/Hr) IV Continuous <Continuous>  dextrose 5%. 1000 milliLiter(s) (100 mL/Hr) IV Continuous <Continuous>  dextrose 50% Injectable 12.5 Gram(s) IV Push once  dextrose 50% Injectable 25 Gram(s) IV Push once  dextrose 50% Injectable 25 Gram(s) IV Push once  doxazosin 2 milliGRAM(s) Oral daily  glucagon  Injectable 1 milliGRAM(s) IntraMuscular once  insulin lispro (ADMELOG) corrective regimen sliding scale   SubCutaneous at bedtime  insulin lispro (ADMELOG) corrective regimen sliding scale   SubCutaneous three times a day before meals  pantoprazole    Tablet 40 milliGRAM(s) Oral before breakfast  sodium zirconium cyclosilicate 5 Gram(s) Oral once    MEDICATIONS  (PRN):  dextrose Oral Gel 15 Gram(s) Oral once PRN Blood Glucose LESS THAN 70 milliGRAM(s)/deciliter      Allergies    No Known Allergies    Intolerances        REVIEW OF SYSTEMS: No CP, palpitations, dizziness or SOB    Vital Signs Last 24 Hrs  T(C): 36.2 (07 Jul 2023 04:35), Max: 36.8 (06 Jul 2023 14:48)  T(F): 97.1 (07 Jul 2023 04:35), Max: 98 (06 Jul 2023 20:16)  HR: 56 (07 Jul 2023 04:35) (56 - 70)  BP: 135/63 (07 Jul 2023 04:35) (135/63 - 155/73)  BP(mean): 94 (06 Jul 2023 14:48) (94 - 94)  RR: 18 (07 Jul 2023 04:35) (18 - 18)  SpO2: 97% (07 Jul 2023 08:18) (97% - 97%)    Parameters below as of 07 Jul 2023 08:18  Patient On (Oxygen Delivery Method): room air          Physical Exam  GENERAL: In no apparent distress, well nourished, and hydrated.  EYES: EOMI, PERRLA, conjunctiva and sclera clear  NECK: Supple  HEART: Regular rate and rhythm; No murmurs, rubs, or gallops.  PULMONARY: Clear to auscultation and perfusion.  No rales, wheezing, or rhonchi bilaterally.  EXTREMITIES:  2+ Peripheral Pulses, No clubbing, cyanosis, or edema  SKIN: Dressing removed from L chest wall, no hematoma. Steri strips open to air  NEUROLOGICAL: Grossly nonfocal    LABS:                        14.3   9.80  )-----------( 227      ( 07 Jul 2023 05:33 )             42.3     07-07    140  |  104  |  13  ----------------------------<  113<H>  5.1<H>   |  25  |  0.8    Ca    9.7      07 Jul 2023 05:33  Mg     2.0     07-07    TPro  6.4  /  Alb  4.2  /  TBili  1.7<H>  /  DBili  x   /  AST  14  /  ALT  8   /  AlkPhos  98  07-07    PT/INR - ( 06 Jul 2023 00:05 )   PT: 13.20 sec;   INR: 1.15 ratio         PTT - ( 06 Jul 2023 00:05 )  PTT:31.0 sec  Urinalysis Basic - ( 07 Jul 2023 05:33 )    Color: x / Appearance: x / SG: x / pH: x  Gluc: 113 mg/dL / Ketone: x  / Bili: x / Urobili: x   Blood: x / Protein: x / Nitrite: x   Leuk Esterase: x / RBC: x / WBC x   Sq Epi: x / Non Sq Epi: x / Bacteria: x        RADIOLOGY & ADDITIONAL TESTS:

## 2023-07-07 NOTE — DISCHARGE NOTE NURSING/CASE MANAGEMENT/SOCIAL WORK - NSDCPEFALRISK_GEN_ALL_CORE
For information on Fall & Injury Prevention, visit: [email or web address] OR  [email or web address] OR  https://www.cdc.gov/steadi/patient.html

## 2023-07-07 NOTE — DISCHARGE NOTE PROVIDER - CARE PROVIDER_API CALL
Rohan Couch  Cardiac Electrophysiology  [address] [place] [address], [address] [address]  Phone: [phone]  Fax: [phone]  Follow Up Time: 1 week

## 2023-07-08 LAB
HCV RNA FLD QL NAA+PROBE: SIGNIFICANT CHANGE UP
HCV RNA SPEC QL PROBE+SIG AMP: SIGNIFICANT CHANGE UP

## 2023-07-11 ENCOUNTER — APPOINTMENT (OUTPATIENT)
Dept: CARDIOLOGY | Facility: CLINIC | Age: 73
End: 2023-07-11
Payer: MEDICARE

## 2023-07-11 PROCEDURE — 93228 REMOTE 30 DAY ECG REV/REPORT: CPT

## 2023-07-12 DIAGNOSIS — K90.0 CELIAC DISEASE: ICD-10-CM

## 2023-07-12 DIAGNOSIS — I45.2 BIFASCICULAR BLOCK: ICD-10-CM

## 2023-07-12 DIAGNOSIS — K21.9 GASTRO-ESOPHAGEAL REFLUX DISEASE WITHOUT ESOPHAGITIS: ICD-10-CM

## 2023-07-12 DIAGNOSIS — G47.33 OBSTRUCTIVE SLEEP APNEA (ADULT) (PEDIATRIC): ICD-10-CM

## 2023-07-12 DIAGNOSIS — Z91.199 PATIENT'S NONCOMPLIANCE WITH OTHER MEDICAL TREATMENT AND REGIMEN DUE TO UNSPECIFIED REASON: ICD-10-CM

## 2023-07-12 DIAGNOSIS — I10 ESSENTIAL (PRIMARY) HYPERTENSION: ICD-10-CM

## 2023-07-12 DIAGNOSIS — Z87.891 PERSONAL HISTORY OF NICOTINE DEPENDENCE: ICD-10-CM

## 2023-07-12 DIAGNOSIS — I35.0 NONRHEUMATIC AORTIC (VALVE) STENOSIS: ICD-10-CM

## 2023-07-12 DIAGNOSIS — I44.2 ATRIOVENTRICULAR BLOCK, COMPLETE: ICD-10-CM

## 2023-07-12 DIAGNOSIS — I45.10 UNSPECIFIED RIGHT BUNDLE-BRANCH BLOCK: ICD-10-CM

## 2023-07-12 DIAGNOSIS — Z79.82 LONG TERM (CURRENT) USE OF ASPIRIN: ICD-10-CM

## 2023-07-12 DIAGNOSIS — I44.1 ATRIOVENTRICULAR BLOCK, SECOND DEGREE: ICD-10-CM

## 2023-07-12 DIAGNOSIS — E11.9 TYPE 2 DIABETES MELLITUS WITHOUT COMPLICATIONS: ICD-10-CM

## 2023-07-12 DIAGNOSIS — R94.31 ABNORMAL ELECTROCARDIOGRAM [ECG] [EKG]: ICD-10-CM

## 2023-07-12 DIAGNOSIS — E78.5 HYPERLIPIDEMIA, UNSPECIFIED: ICD-10-CM

## 2023-07-13 ENCOUNTER — APPOINTMENT (OUTPATIENT)
Dept: SURGERY | Facility: AMBULATORY SURGERY CENTER | Age: 73
End: 2023-07-13

## 2023-07-20 ENCOUNTER — APPOINTMENT (OUTPATIENT)
Dept: ELECTROPHYSIOLOGY | Facility: CLINIC | Age: 73
End: 2023-07-20

## 2023-07-27 ENCOUNTER — APPOINTMENT (OUTPATIENT)
Dept: SURGERY | Facility: CLINIC | Age: 73
End: 2023-07-27

## 2023-08-04 ENCOUNTER — APPOINTMENT (OUTPATIENT)
Dept: ELECTROPHYSIOLOGY | Facility: CLINIC | Age: 73
End: 2023-08-04
Payer: MEDICARE

## 2023-08-04 DIAGNOSIS — Z48.89 ENCOUNTER FOR OTHER SPECIFIED SURGICAL AFTERCARE: ICD-10-CM

## 2023-08-04 PROCEDURE — 93280 PM DEVICE PROGR EVAL DUAL: CPT

## 2023-08-04 PROCEDURE — 99024 POSTOP FOLLOW-UP VISIT: CPT

## 2023-08-04 RX ORDER — OMEGA-3/DHA/EPA/FISH OIL 60 MG-90MG
500 CAPSULE ORAL
Refills: 0 | Status: DISCONTINUED | COMMUNITY
End: 2023-08-04

## 2023-08-04 RX ORDER — PRAVASTATIN SODIUM 20 MG/1
20 TABLET ORAL
Refills: 0 | Status: ACTIVE | COMMUNITY
Start: 2023-08-04

## 2023-08-04 RX ORDER — FERROUS SULFATE 325(65) MG
325 (65 FE) TABLET ORAL
Refills: 0 | Status: ACTIVE | COMMUNITY
Start: 2023-08-04

## 2023-08-04 RX ORDER — VITAMIN K2 90 MCG
125 MCG CAPSULE ORAL
Refills: 0 | Status: ACTIVE | COMMUNITY
Start: 2023-08-04

## 2023-08-04 RX ORDER — AMLODIPINE BESYLATE 10 MG/1
10 TABLET ORAL DAILY
Refills: 0 | Status: ACTIVE | COMMUNITY
Start: 2019-05-09

## 2023-08-04 NOTE — ASSESSMENT
[FreeTextEntry1] : Sinus pause, 2 AVB s/p DC PPM implant - Wound is well healed. There are no signs or symptoms of infection of inflammation. There is no redness, no swelling, no erythema. The patient has no pain.  - Device interrogation reveals elevated thresholds, especially RV threshold. Patient has ectopy during testing but clearly thresholds have increased. Patient denies raising arms high but does raise arm to head. I recommend chest x-ray to r/o lead movement. - We discussed that if leads have moved, then we will recommend revision as it is preferable to revise leads within the first few months if there is a need. He understands. - Patient is enrolled in remote monitoring services. I reviewed remote transmission process with the patient, as well as schedule and ability to do manual transmission. We also spoke about associated co-payments with remote monitoring that may not be covered by insurance.  NSVT - NSVT episode as described above. I recommend ischemic work up. EF 7/6/2023 60-65%. - Consider adding beta blockers PAF - PAF in problem list, not on OAC  Plan - Obtain chest xray - Will make Dr. Coyne aware of NSVT episode, need for ischemic work up? - Follow up in 2-3 months or sooner if lead revision necessary

## 2023-08-04 NOTE — HISTORY OF PRESENT ILLNESS
[None] : The patient complains of no symptoms [de-identified] : 74 y/o M with PMHx of DM, HTN, DEONNA not on CPAP, celiac disease, former smoker s/p syncopal episode had holter placed by Dr. Coyne showing long pauses.   He underwent [employer] PPM placement for AVB. He presents for wound check and follow up. He is feeling well and has no cardiac complaints.

## 2023-08-04 NOTE — PHYSICAL EXAM
[General Appearance - Well Developed] : well developed [Normal Appearance] : normal appearance [Well Groomed] : well groomed [General Appearance - Well Nourished] : well nourished [No Deformities] : no deformities [General Appearance - In No Acute Distress] : no acute distress [Heart Sounds] : normal S1 and S2 [Heart Rate And Rhythm] : heart rate and rhythm were normal [Murmurs] : no murmurs present [Respiration, Rhythm And Depth] : normal respiratory rhythm and effort [Left Infraclavicular] : left infraclavicular area [Clean] : clean [Well-Healed] : well-healed [Dry] : dry [Abdomen Soft] : soft [Cyanosis, Localized] : no localized cyanosis [Nail Clubbing] : no clubbing of the fingernails [FreeTextEntry2] : Steri strips removed

## 2023-08-04 NOTE — PROCEDURE
[No] : not [2nd Degree Block] : second degree block [See Device Printout] : See device printout [DDD] : DDD [Lead Imp:  ___ohms] : lead impedance was [unfilled] ohms [Sensing Amplitude ___mv] : sensing amplitude was [unfilled] mv [___V @] : [unfilled] V [___ ms] : [unfilled] ms [de-identified] : [facility] [de-identified] : Eduardo [id number] [de-identified] : [id number] [de-identified] : 7/6/2023 [de-identified] : 50/120 [de-identified] : 5.5 years [de-identified] : 1 NSVT 7/31/2023 16 beats Several PMT episodes, PVARP increased 300-340ms  Atrial and ventricular outputs higher than implant. Ventricular impedence decreased from implant as well. I suspect that leads have moved. Ventricular output increased to 3.5V @ 1.0ms AP: 3% : 98%

## 2023-08-10 DIAGNOSIS — I47.29 OTHER VENTRICULAR TACHYCARDIA: ICD-10-CM

## 2023-08-17 ENCOUNTER — APPOINTMENT (OUTPATIENT)
Dept: CV DIAGNOSTICS | Facility: HOSPITAL | Age: 73
End: 2023-08-17
Payer: MEDICARE

## 2023-08-17 ENCOUNTER — OUTPATIENT (OUTPATIENT)
Dept: OUTPATIENT SERVICES | Facility: HOSPITAL | Age: 73
LOS: 1 days | End: 2023-08-17
Payer: MEDICARE

## 2023-08-17 ENCOUNTER — RESULT REVIEW (OUTPATIENT)
Age: 73
End: 2023-08-17

## 2023-08-17 DIAGNOSIS — I47.29 OTHER VENTRICULAR TACHYCARDIA: ICD-10-CM

## 2023-08-17 DIAGNOSIS — I45.10 UNSPECIFIED RIGHT BUNDLE-BRANCH BLOCK: ICD-10-CM

## 2023-08-17 DIAGNOSIS — Z90.49 ACQUIRED ABSENCE OF OTHER SPECIFIED PARTS OF DIGESTIVE TRACT: Chronic | ICD-10-CM

## 2023-08-17 DIAGNOSIS — R07.9 CHEST PAIN, UNSPECIFIED: ICD-10-CM

## 2023-08-17 DIAGNOSIS — Z98.890 OTHER SPECIFIED POSTPROCEDURAL STATES: Chronic | ICD-10-CM

## 2023-08-17 PROCEDURE — 93018 CV STRESS TEST I&R ONLY: CPT

## 2023-08-17 PROCEDURE — 78452 HT MUSCLE IMAGE SPECT MULT: CPT | Mod: ME

## 2023-08-17 PROCEDURE — G1004: CPT

## 2023-08-17 PROCEDURE — 78452 HT MUSCLE IMAGE SPECT MULT: CPT | Mod: 26,ME

## 2023-08-17 PROCEDURE — 71046 X-RAY EXAM CHEST 2 VIEWS: CPT | Mod: 26

## 2023-08-17 PROCEDURE — 93016 CV STRESS TEST SUPVJ ONLY: CPT

## 2023-08-17 PROCEDURE — 93017 CV STRESS TEST TRACING ONLY: CPT

## 2023-08-17 PROCEDURE — A9500: CPT

## 2023-08-17 PROCEDURE — 71046 X-RAY EXAM CHEST 2 VIEWS: CPT

## 2023-08-18 DIAGNOSIS — R07.9 CHEST PAIN, UNSPECIFIED: ICD-10-CM

## 2023-08-18 DIAGNOSIS — I47.29 OTHER VENTRICULAR TACHYCARDIA: ICD-10-CM

## 2023-08-18 DIAGNOSIS — I45.10 UNSPECIFIED RIGHT BUNDLE-BRANCH BLOCK: ICD-10-CM

## 2023-10-09 ENCOUNTER — APPOINTMENT (OUTPATIENT)
Dept: CARDIOLOGY | Facility: CLINIC | Age: 73
End: 2023-10-09
Payer: MEDICARE

## 2023-10-09 VITALS
WEIGHT: 176 LBS | BODY MASS INDEX: 24.64 KG/M2 | HEIGHT: 71 IN | SYSTOLIC BLOOD PRESSURE: 114 MMHG | DIASTOLIC BLOOD PRESSURE: 60 MMHG | HEART RATE: 60 BPM

## 2023-10-09 DIAGNOSIS — I48.92 UNSPECIFIED ATRIAL FLUTTER: ICD-10-CM

## 2023-10-09 PROCEDURE — 99214 OFFICE O/P EST MOD 30 MIN: CPT | Mod: 25

## 2023-10-09 PROCEDURE — 93000 ELECTROCARDIOGRAM COMPLETE: CPT

## 2023-10-20 ENCOUNTER — APPOINTMENT (OUTPATIENT)
Dept: ELECTROPHYSIOLOGY | Facility: CLINIC | Age: 73
End: 2023-10-20
Payer: MEDICARE

## 2023-10-20 VITALS
HEART RATE: 64 BPM | SYSTOLIC BLOOD PRESSURE: 119 MMHG | DIASTOLIC BLOOD PRESSURE: 67 MMHG | HEIGHT: 71 IN | BODY MASS INDEX: 24.78 KG/M2 | TEMPERATURE: 98 F | WEIGHT: 177 LBS

## 2023-10-20 DIAGNOSIS — Z45.09 ENCOUNTER FOR ADJUSTMENT AND MANAGEMENT OF OTHER CARDIAC DEVICE: ICD-10-CM

## 2023-10-20 DIAGNOSIS — I44.0 ATRIOVENTRICULAR BLOCK, FIRST DEGREE: ICD-10-CM

## 2023-10-20 PROCEDURE — 93280 PM DEVICE PROGR EVAL DUAL: CPT

## 2023-10-20 PROCEDURE — 99214 OFFICE O/P EST MOD 30 MIN: CPT | Mod: 25

## 2023-10-27 ENCOUNTER — APPOINTMENT (OUTPATIENT)
Dept: CARDIOLOGY | Facility: CLINIC | Age: 73
End: 2023-10-27
Payer: MEDICARE

## 2023-10-27 DIAGNOSIS — I48.91 UNSPECIFIED ATRIAL FIBRILLATION: ICD-10-CM

## 2023-10-27 PROCEDURE — 93306 TTE W/DOPPLER COMPLETE: CPT

## 2023-11-03 ENCOUNTER — NON-APPOINTMENT (OUTPATIENT)
Age: 73
End: 2023-11-03

## 2023-11-03 ENCOUNTER — APPOINTMENT (OUTPATIENT)
Dept: CARDIOLOGY | Facility: CLINIC | Age: 73
End: 2023-11-03
Payer: MEDICARE

## 2023-11-03 PROCEDURE — 93296 REM INTERROG EVL PM/IDS: CPT

## 2023-11-03 PROCEDURE — 93294 REM INTERROG EVL PM/LDLS PM: CPT

## 2024-02-02 ENCOUNTER — APPOINTMENT (OUTPATIENT)
Dept: CARDIOLOGY | Facility: CLINIC | Age: 74
End: 2024-02-02
Payer: MEDICARE

## 2024-02-02 ENCOUNTER — NON-APPOINTMENT (OUTPATIENT)
Age: 74
End: 2024-02-02

## 2024-02-02 PROCEDURE — 93296 REM INTERROG EVL PM/IDS: CPT

## 2024-02-02 PROCEDURE — 93294 REM INTERROG EVL PM/LDLS PM: CPT

## 2024-02-05 NOTE — ED PROVIDER NOTE - DISCHARGE DATE
Scheduled for:  Colonoscopy [id number]  Provider Name:     Date:  Tues, 07/30/2024  Location:  [facility]   Sedation:  Mac  Time:  1:30pm (pt is aware to arrive at 12:30pm     Prep:  Suprep  Meds/Allergies Reconciled?:  Yes    Diagnosis with codes:   Personal history of adenomatous colon polyps, Z86.010 Family history of colon cancer Z80.0   Was patient informed to call insurance with codes (Y/N):  Yes     Referral sent?:  Referral was sent at the time of electronic surgical scheduling.    EM or [facility] notified?:  I sent an electronic request to Endo Scheduling and received a confirmation today.       Medication Orders:  None  Misc Orders:  None     Further instructions given by staff:  I discussed the prep instructions with the patient which she verbally understood and is aware that I will send the instructions today.via [employer]        14-Nov-2022

## 2024-02-16 ENCOUNTER — APPOINTMENT (OUTPATIENT)
Dept: CARDIOLOGY | Facility: CLINIC | Age: 74
End: 2024-02-16
Payer: MEDICARE

## 2024-02-16 VITALS
BODY MASS INDEX: 25.76 KG/M2 | DIASTOLIC BLOOD PRESSURE: 74 MMHG | HEART RATE: 70 BPM | HEIGHT: 71 IN | SYSTOLIC BLOOD PRESSURE: 122 MMHG | WEIGHT: 184 LBS

## 2024-02-16 DIAGNOSIS — I35.0 NONRHEUMATIC AORTIC (VALVE) STENOSIS: ICD-10-CM

## 2024-02-16 DIAGNOSIS — K21.9 GASTRO-ESOPHAGEAL REFLUX DISEASE W/OUT ESOPHAGITIS: ICD-10-CM

## 2024-02-16 DIAGNOSIS — I10 ESSENTIAL (PRIMARY) HYPERTENSION: ICD-10-CM

## 2024-02-16 DIAGNOSIS — I48.92 UNSPECIFIED ATRIAL FIBRILLATION: ICD-10-CM

## 2024-02-16 DIAGNOSIS — I44.1 ATRIOVENTRICULAR BLOCK, SECOND DEGREE: ICD-10-CM

## 2024-02-16 DIAGNOSIS — E11.9 TYPE 2 DIABETES MELLITUS W/OUT COMPLICATIONS: ICD-10-CM

## 2024-02-16 DIAGNOSIS — Z95.0 PRESENCE OF CARDIAC PACEMAKER: ICD-10-CM

## 2024-02-16 DIAGNOSIS — I45.10 UNSPECIFIED RIGHT BUNDLE-BRANCH BLOCK: ICD-10-CM

## 2024-02-16 DIAGNOSIS — R00.1 BRADYCARDIA, UNSPECIFIED: ICD-10-CM

## 2024-02-16 DIAGNOSIS — I48.91 UNSPECIFIED ATRIAL FIBRILLATION: ICD-10-CM

## 2024-02-16 PROCEDURE — 99214 OFFICE O/P EST MOD 30 MIN: CPT | Mod: 25

## 2024-02-16 PROCEDURE — 93000 ELECTROCARDIOGRAM COMPLETE: CPT

## 2024-02-16 RX ORDER — OMEGA-3S/DHA/EPA/FISH OIL 1000-1400
CAPSULE,DELAYED RELEASE (ENTERIC COATED) ORAL
Refills: 0 | Status: ACTIVE | COMMUNITY

## 2024-02-16 RX ORDER — ELECTROLYTES/DEXTROSE
SOLUTION, ORAL ORAL
Refills: 0 | Status: ACTIVE | COMMUNITY

## 2024-02-16 NOTE — HISTORY OF PRESENT ILLNESS
[FreeTextEntry1] : The patient has had left nephrectomy . HE has not had chest pain or SOB . HAs ssupected RCC and was not given chemo et al . He has a PPM . States that he does feel better .

## 2024-02-16 NOTE — ASSESSMENT
[FreeTextEntry1] : The patient has had a left nephrectomy for suspected RCC .  This was uncomplicated . He has not had chest pain or SOB . LDL is at goal .BP is stable and he has remained clinically in NSR .  He has mild AS

## 2024-02-16 NOTE — CARDIOLOGY SUMMARY
[de-identified] : 6-1-2022 NSR with marked first degree AV block PAC RBBB  1- NSR first degree AV block RBBB LAFB  7-3-2023 Sinus Bradycardia First degree AV block RBBB  2- NSR ventriuclar pacing .  [de-identified] : 8- Lexiscan stress test  Large inferolateral and infeoseptal  defect which is fixed  with normal LV systolic function  [de-identified] : 11- EF 55-60% mild MR mild TR   mild AS Ascending aorta was 3.8 cm   Normal LV systolic function EF 63% mild AS trace TR mild TR  [___] : [unfilled] [LVEF ___%] : LVEF [unfilled]% [None] : normal LV function

## 2024-02-16 NOTE — REASON FOR VISIT
[CV Risk Factors and Non-Cardiac Disease] : CV risk factors and non-cardiac disease [Arrhythmia/ECG Abnorrmalities] : arrhythmia/ECG abnormalities [Hyperlipidemia] : hyperlipidemia [FreeTextEntry3] : Marcos  [Follow-Up - Clinic] : a clinic follow-up of [Atrial Fibrillation] : atrial fibrillation [Hypertension] : hypertension [Medication Management] : Medication management

## 2024-02-16 NOTE — PHYSICAL EXAM
[General Appearance - Well Developed] : well developed [Normal Appearance] : normal appearance [Well Groomed] : well groomed [General Appearance - Well Nourished] : well nourished [No Deformities] : no deformities [Normal Conjunctiva] : the conjunctiva exhibited no abnormalities [Normal Oral Mucosa] : normal oral mucosa [Normal Oropharynx] : normal oropharynx [FreeTextEntry1] : No JVD [Respiration, Rhythm And Depth] : normal respiratory rhythm and effort [Exaggerated Use Of Accessory Muscles For Inspiration] : no accessory muscle use [Heart Rate And Rhythm] : heart rate and rhythm were normal [Heart Sounds] : normal S1 and S2 [Abdomen Mass (___ Cm)] : no abdominal mass palpated [Abnormal Walk] : normal gait [Nail Clubbing] : no clubbing of the fingernails [Cyanosis, Localized] : no localized cyanosis [Skin Color & Pigmentation] : normal skin color and pigmentation [Skin Turgor] : normal skin turgor [] : no rash [Oriented To Time, Place, And Person] : oriented to person, place, and time [Impaired Insight] : insight and judgment were intact [Affect] : the affect was normal [No Anxiety] : not feeling anxious

## 2024-04-26 ENCOUNTER — OUTPATIENT (OUTPATIENT)
Dept: OUTPATIENT SERVICES | Facility: HOSPITAL | Age: 74
LOS: 1 days | End: 2024-04-26
Payer: MEDICARE

## 2024-04-26 DIAGNOSIS — Z98.890 OTHER SPECIFIED POSTPROCEDURAL STATES: Chronic | ICD-10-CM

## 2024-04-26 DIAGNOSIS — Z90.49 ACQUIRED ABSENCE OF OTHER SPECIFIED PARTS OF DIGESTIVE TRACT: Chronic | ICD-10-CM

## 2024-04-26 DIAGNOSIS — K02.9 DENTAL CARIES, UNSPECIFIED: ICD-10-CM

## 2024-04-26 PROCEDURE — D0140: CPT

## 2024-04-29 DIAGNOSIS — K02.63 DENTAL CARIES ON SMOOTH SURFACE PENETRATING INTO PULP: ICD-10-CM

## 2024-05-02 ENCOUNTER — NON-APPOINTMENT (OUTPATIENT)
Age: 74
End: 2024-05-02

## 2024-05-03 ENCOUNTER — APPOINTMENT (OUTPATIENT)
Dept: CARDIOLOGY | Facility: CLINIC | Age: 74
End: 2024-05-03
Payer: MEDICARE

## 2024-05-03 ENCOUNTER — INPATIENT (INPATIENT)
Facility: HOSPITAL | Age: 74
LOS: 4 days | Discharge: HOME | DRG: 390 | End: 2024-05-08
Attending: STUDENT IN AN ORGANIZED HEALTH CARE EDUCATION/TRAINING PROGRAM | Admitting: STUDENT IN AN ORGANIZED HEALTH CARE EDUCATION/TRAINING PROGRAM
Payer: MEDICARE

## 2024-05-03 VITALS
OXYGEN SATURATION: 98 % | DIASTOLIC BLOOD PRESSURE: 69 MMHG | RESPIRATION RATE: 18 BRPM | SYSTOLIC BLOOD PRESSURE: 131 MMHG | TEMPERATURE: 99 F | HEART RATE: 88 BPM | WEIGHT: 190.04 LBS

## 2024-05-03 DIAGNOSIS — Z98.890 OTHER SPECIFIED POSTPROCEDURAL STATES: Chronic | ICD-10-CM

## 2024-05-03 DIAGNOSIS — Z90.49 ACQUIRED ABSENCE OF OTHER SPECIFIED PARTS OF DIGESTIVE TRACT: Chronic | ICD-10-CM

## 2024-05-03 DIAGNOSIS — K56.609 UNSPECIFIED INTESTINAL OBSTRUCTION, UNSPECIFIED AS TO PARTIAL VERSUS COMPLETE OBSTRUCTION: ICD-10-CM

## 2024-05-03 LAB
ALBUMIN SERPL ELPH-MCNC: 4.3 G/DL — SIGNIFICANT CHANGE UP (ref 3.5–5.2)
ALP SERPL-CCNC: 107 U/L — SIGNIFICANT CHANGE UP (ref 30–115)
ALT FLD-CCNC: 9 U/L — SIGNIFICANT CHANGE UP (ref 0–41)
ANION GAP SERPL CALC-SCNC: 13 MMOL/L — SIGNIFICANT CHANGE UP (ref 7–14)
ANION GAP SERPL CALC-SCNC: 14 MMOL/L — SIGNIFICANT CHANGE UP (ref 7–14)
APPEARANCE UR: CLEAR — SIGNIFICANT CHANGE UP
APTT BLD: 32.6 SEC — SIGNIFICANT CHANGE UP (ref 27–39.2)
AST SERPL-CCNC: 15 U/L — SIGNIFICANT CHANGE UP (ref 0–41)
BACTERIA # UR AUTO: NEGATIVE /HPF — SIGNIFICANT CHANGE UP
BASOPHILS # BLD AUTO: 0.03 K/UL — SIGNIFICANT CHANGE UP (ref 0–0.2)
BASOPHILS # BLD AUTO: 0.03 K/UL — SIGNIFICANT CHANGE UP (ref 0–0.2)
BASOPHILS NFR BLD AUTO: 0.2 % — SIGNIFICANT CHANGE UP (ref 0–1)
BASOPHILS NFR BLD AUTO: 0.3 % — SIGNIFICANT CHANGE UP (ref 0–1)
BILIRUB SERPL-MCNC: 1.3 MG/DL — HIGH (ref 0.2–1.2)
BILIRUB UR-MCNC: NEGATIVE — SIGNIFICANT CHANGE UP
BLD GP AB SCN SERPL QL: SIGNIFICANT CHANGE UP
BUN SERPL-MCNC: 19 MG/DL — SIGNIFICANT CHANGE UP (ref 10–20)
BUN SERPL-MCNC: 22 MG/DL — HIGH (ref 10–20)
CALCIUM SERPL-MCNC: 8.7 MG/DL — SIGNIFICANT CHANGE UP (ref 8.4–10.4)
CALCIUM SERPL-MCNC: 9.5 MG/DL — SIGNIFICANT CHANGE UP (ref 8.4–10.4)
CAST: 2 /LPF — SIGNIFICANT CHANGE UP (ref 0–4)
CHLORIDE SERPL-SCNC: 101 MMOL/L — SIGNIFICANT CHANGE UP (ref 98–110)
CHLORIDE SERPL-SCNC: 102 MMOL/L — SIGNIFICANT CHANGE UP (ref 98–110)
CO2 SERPL-SCNC: 19 MMOL/L — SIGNIFICANT CHANGE UP (ref 17–32)
CO2 SERPL-SCNC: 23 MMOL/L — SIGNIFICANT CHANGE UP (ref 17–32)
COLOR SPEC: YELLOW — SIGNIFICANT CHANGE UP
CREAT SERPL-MCNC: 1.1 MG/DL — SIGNIFICANT CHANGE UP (ref 0.7–1.5)
CREAT SERPL-MCNC: 1.3 MG/DL — SIGNIFICANT CHANGE UP (ref 0.7–1.5)
DIFF PNL FLD: NEGATIVE — SIGNIFICANT CHANGE UP
EGFR: 59 ML/MIN/1.73M2 — LOW
EGFR: 72 ML/MIN/1.73M2 — SIGNIFICANT CHANGE UP
EOSINOPHIL # BLD AUTO: 0.15 K/UL — SIGNIFICANT CHANGE UP (ref 0–0.7)
EOSINOPHIL # BLD AUTO: 0.45 K/UL — SIGNIFICANT CHANGE UP (ref 0–0.7)
EOSINOPHIL NFR BLD AUTO: 1.1 % — SIGNIFICANT CHANGE UP (ref 0–8)
EOSINOPHIL NFR BLD AUTO: 4.9 % — SIGNIFICANT CHANGE UP (ref 0–8)
GLUCOSE BLDC GLUCOMTR-MCNC: 105 MG/DL — HIGH (ref 70–99)
GLUCOSE BLDC GLUCOMTR-MCNC: 115 MG/DL — HIGH (ref 70–99)
GLUCOSE SERPL-MCNC: 153 MG/DL — HIGH (ref 70–99)
GLUCOSE SERPL-MCNC: 98 MG/DL — SIGNIFICANT CHANGE UP (ref 70–99)
GLUCOSE UR QL: NEGATIVE MG/DL — SIGNIFICANT CHANGE UP
HCT VFR BLD CALC: 38.4 % — LOW (ref 42–52)
HCT VFR BLD CALC: 43.3 % — SIGNIFICANT CHANGE UP (ref 42–52)
HGB BLD-MCNC: 13.1 G/DL — LOW (ref 14–18)
HGB BLD-MCNC: 15.1 G/DL — SIGNIFICANT CHANGE UP (ref 14–18)
IMM GRANULOCYTES NFR BLD AUTO: 0.3 % — SIGNIFICANT CHANGE UP (ref 0.1–0.3)
IMM GRANULOCYTES NFR BLD AUTO: 0.3 % — SIGNIFICANT CHANGE UP (ref 0.1–0.3)
INR BLD: 1.13 RATIO — SIGNIFICANT CHANGE UP (ref 0.65–1.3)
KETONES UR-MCNC: 15 MG/DL
LACTATE SERPL-SCNC: 1 MMOL/L — SIGNIFICANT CHANGE UP (ref 0.7–2)
LEUKOCYTE ESTERASE UR-ACNC: ABNORMAL
LIDOCAIN IGE QN: 14 U/L — SIGNIFICANT CHANGE UP (ref 7–60)
LYMPHOCYTES # BLD AUTO: 0.61 K/UL — LOW (ref 1.2–3.4)
LYMPHOCYTES # BLD AUTO: 1.75 K/UL — SIGNIFICANT CHANGE UP (ref 1.2–3.4)
LYMPHOCYTES # BLD AUTO: 19.1 % — LOW (ref 20.5–51.1)
LYMPHOCYTES # BLD AUTO: 4.4 % — LOW (ref 20.5–51.1)
MAGNESIUM SERPL-MCNC: 1.9 MG/DL — SIGNIFICANT CHANGE UP (ref 1.8–2.4)
MCHC RBC-ENTMCNC: 31 PG — SIGNIFICANT CHANGE UP (ref 27–31)
MCHC RBC-ENTMCNC: 31.8 PG — HIGH (ref 27–31)
MCHC RBC-ENTMCNC: 34.1 G/DL — SIGNIFICANT CHANGE UP (ref 32–37)
MCHC RBC-ENTMCNC: 34.9 G/DL — SIGNIFICANT CHANGE UP (ref 32–37)
MCV RBC AUTO: 90.8 FL — SIGNIFICANT CHANGE UP (ref 80–94)
MCV RBC AUTO: 91.2 FL — SIGNIFICANT CHANGE UP (ref 80–94)
MONOCYTES # BLD AUTO: 0.64 K/UL — HIGH (ref 0.1–0.6)
MONOCYTES # BLD AUTO: 0.85 K/UL — HIGH (ref 0.1–0.6)
MONOCYTES NFR BLD AUTO: 6.2 % — SIGNIFICANT CHANGE UP (ref 1.7–9.3)
MONOCYTES NFR BLD AUTO: 7 % — SIGNIFICANT CHANGE UP (ref 1.7–9.3)
NEUTROPHILS # BLD AUTO: 12.05 K/UL — HIGH (ref 1.4–6.5)
NEUTROPHILS # BLD AUTO: 6.24 K/UL — SIGNIFICANT CHANGE UP (ref 1.4–6.5)
NEUTROPHILS NFR BLD AUTO: 68.4 % — SIGNIFICANT CHANGE UP (ref 42.2–75.2)
NEUTROPHILS NFR BLD AUTO: 87.8 % — HIGH (ref 42.2–75.2)
NITRITE UR-MCNC: NEGATIVE — SIGNIFICANT CHANGE UP
NRBC # BLD: 0 /100 WBCS — SIGNIFICANT CHANGE UP (ref 0–0)
NRBC # BLD: 0 /100 WBCS — SIGNIFICANT CHANGE UP (ref 0–0)
PH UR: 6.5 — SIGNIFICANT CHANGE UP (ref 5–8)
PHOSPHATE SERPL-MCNC: 3.2 MG/DL — SIGNIFICANT CHANGE UP (ref 2.1–4.9)
PLATELET # BLD AUTO: 241 K/UL — SIGNIFICANT CHANGE UP (ref 130–400)
PLATELET # BLD AUTO: 268 K/UL — SIGNIFICANT CHANGE UP (ref 130–400)
PMV BLD: 10.3 FL — SIGNIFICANT CHANGE UP (ref 7.4–10.4)
PMV BLD: 10.6 FL — HIGH (ref 7.4–10.4)
POTASSIUM SERPL-MCNC: 4.4 MMOL/L — SIGNIFICANT CHANGE UP (ref 3.5–5)
POTASSIUM SERPL-MCNC: 5.2 MMOL/L — HIGH (ref 3.5–5)
POTASSIUM SERPL-SCNC: 4.4 MMOL/L — SIGNIFICANT CHANGE UP (ref 3.5–5)
POTASSIUM SERPL-SCNC: 5.2 MMOL/L — HIGH (ref 3.5–5)
PROT SERPL-MCNC: 6.5 G/DL — SIGNIFICANT CHANGE UP (ref 6–8)
PROT UR-MCNC: SIGNIFICANT CHANGE UP MG/DL
PROTHROM AB SERPL-ACNC: 12.9 SEC — HIGH (ref 9.95–12.87)
RBC # BLD: 4.23 M/UL — LOW (ref 4.7–6.1)
RBC # BLD: 4.75 M/UL — SIGNIFICANT CHANGE UP (ref 4.7–6.1)
RBC # FLD: 13.1 % — SIGNIFICANT CHANGE UP (ref 11.5–14.5)
RBC # FLD: 13.2 % — SIGNIFICANT CHANGE UP (ref 11.5–14.5)
RBC CASTS # UR COMP ASSIST: 1 /HPF — SIGNIFICANT CHANGE UP (ref 0–4)
SODIUM SERPL-SCNC: 135 MMOL/L — SIGNIFICANT CHANGE UP (ref 135–146)
SODIUM SERPL-SCNC: 137 MMOL/L — SIGNIFICANT CHANGE UP (ref 135–146)
SP GR SPEC: 1.02 — SIGNIFICANT CHANGE UP (ref 1–1.03)
SQUAMOUS # UR AUTO: 2 /HPF — SIGNIFICANT CHANGE UP (ref 0–5)
UROBILINOGEN FLD QL: 0.2 MG/DL — SIGNIFICANT CHANGE UP (ref 0.2–1)
WBC # BLD: 13.73 K/UL — HIGH (ref 4.8–10.8)
WBC # BLD: 9.14 K/UL — SIGNIFICANT CHANGE UP (ref 4.8–10.8)
WBC # FLD AUTO: 13.73 K/UL — HIGH (ref 4.8–10.8)
WBC # FLD AUTO: 9.14 K/UL — SIGNIFICANT CHANGE UP (ref 4.8–10.8)
WBC UR QL: 2 /HPF — SIGNIFICANT CHANGE UP (ref 0–5)

## 2024-05-03 PROCEDURE — 80048 BASIC METABOLIC PNL TOTAL CA: CPT

## 2024-05-03 PROCEDURE — 93294 REM INTERROG EVL PM/LDLS PM: CPT

## 2024-05-03 PROCEDURE — 85730 THROMBOPLASTIN TIME PARTIAL: CPT

## 2024-05-03 PROCEDURE — 86901 BLOOD TYPING SEROLOGIC RH(D): CPT

## 2024-05-03 PROCEDURE — 87086 URINE CULTURE/COLONY COUNT: CPT

## 2024-05-03 PROCEDURE — 83735 ASSAY OF MAGNESIUM: CPT

## 2024-05-03 PROCEDURE — C1781: CPT

## 2024-05-03 PROCEDURE — 83036 HEMOGLOBIN GLYCOSYLATED A1C: CPT

## 2024-05-03 PROCEDURE — 36415 COLL VENOUS BLD VENIPUNCTURE: CPT

## 2024-05-03 PROCEDURE — 99285 EMERGENCY DEPT VISIT HI MDM: CPT | Mod: 25

## 2024-05-03 PROCEDURE — 74018 RADEX ABDOMEN 1 VIEW: CPT

## 2024-05-03 PROCEDURE — 85610 PROTHROMBIN TIME: CPT

## 2024-05-03 PROCEDURE — 74177 CT ABD & PELVIS W/CONTRAST: CPT | Mod: 26,MC

## 2024-05-03 PROCEDURE — 93296 REM INTERROG EVL PM/IDS: CPT

## 2024-05-03 PROCEDURE — 99221 1ST HOSP IP/OBS SF/LOW 40: CPT

## 2024-05-03 PROCEDURE — 85025 COMPLETE CBC W/AUTO DIFF WBC: CPT

## 2024-05-03 PROCEDURE — 85027 COMPLETE CBC AUTOMATED: CPT

## 2024-05-03 PROCEDURE — 84100 ASSAY OF PHOSPHORUS: CPT

## 2024-05-03 PROCEDURE — 86900 BLOOD TYPING SEROLOGIC ABO: CPT

## 2024-05-03 PROCEDURE — 82962 GLUCOSE BLOOD TEST: CPT

## 2024-05-03 PROCEDURE — 81001 URINALYSIS AUTO W/SCOPE: CPT

## 2024-05-03 PROCEDURE — 86850 RBC ANTIBODY SCREEN: CPT

## 2024-05-03 RX ORDER — SODIUM CHLORIDE 9 MG/ML
1000 INJECTION, SOLUTION INTRAVENOUS
Refills: 0 | Status: DISCONTINUED | OUTPATIENT
Start: 2024-05-03 | End: 2024-05-07

## 2024-05-03 RX ORDER — DEXTROSE 50 % IN WATER 50 %
25 SYRINGE (ML) INTRAVENOUS ONCE
Refills: 0 | Status: DISCONTINUED | OUTPATIENT
Start: 2024-05-03 | End: 2024-05-07

## 2024-05-03 RX ORDER — DOXAZOSIN MESYLATE 4 MG
2 TABLET ORAL DAILY
Refills: 0 | Status: DISCONTINUED | OUTPATIENT
Start: 2024-05-03 | End: 2024-05-07

## 2024-05-03 RX ORDER — SODIUM CHLORIDE 9 MG/ML
1000 INJECTION, SOLUTION INTRAVENOUS
Refills: 0 | Status: DISCONTINUED | OUTPATIENT
Start: 2024-05-03 | End: 2024-05-04

## 2024-05-03 RX ORDER — HEPARIN SODIUM 5000 [USP'U]/ML
5000 INJECTION INTRAVENOUS; SUBCUTANEOUS EVERY 8 HOURS
Refills: 0 | Status: DISCONTINUED | OUTPATIENT
Start: 2024-05-03 | End: 2024-05-07

## 2024-05-03 RX ORDER — DEXTROSE 10 % IN WATER 10 %
125 INTRAVENOUS SOLUTION INTRAVENOUS ONCE
Refills: 0 | Status: DISCONTINUED | OUTPATIENT
Start: 2024-05-03 | End: 2024-05-07

## 2024-05-03 RX ORDER — ENOXAPARIN SODIUM 100 MG/ML
40 INJECTION SUBCUTANEOUS EVERY 24 HOURS
Refills: 0 | Status: DISCONTINUED | OUTPATIENT
Start: 2024-05-03 | End: 2024-05-03

## 2024-05-03 RX ORDER — CHOLECALCIFEROL (VITAMIN D3) 125 MCG
400 CAPSULE ORAL DAILY
Refills: 0 | Status: DISCONTINUED | OUTPATIENT
Start: 2024-05-03 | End: 2024-05-07

## 2024-05-03 RX ORDER — SODIUM CHLORIDE 9 MG/ML
1000 INJECTION INTRAMUSCULAR; INTRAVENOUS; SUBCUTANEOUS ONCE
Refills: 0 | Status: COMPLETED | OUTPATIENT
Start: 2024-05-03 | End: 2024-05-03

## 2024-05-03 RX ORDER — ASPIRIN/CALCIUM CARB/MAGNESIUM 324 MG
81 TABLET ORAL DAILY
Refills: 0 | Status: DISCONTINUED | OUTPATIENT
Start: 2024-05-03 | End: 2024-05-07

## 2024-05-03 RX ORDER — ONDANSETRON 8 MG/1
4 TABLET, FILM COATED ORAL ONCE
Refills: 0 | Status: DISCONTINUED | OUTPATIENT
Start: 2024-05-03 | End: 2024-05-07

## 2024-05-03 RX ORDER — AMLODIPINE BESYLATE 2.5 MG/1
10 TABLET ORAL DAILY
Refills: 0 | Status: DISCONTINUED | OUTPATIENT
Start: 2024-05-03 | End: 2024-05-07

## 2024-05-03 RX ORDER — INSULIN LISPRO 100/ML
VIAL (ML) SUBCUTANEOUS
Refills: 0 | Status: DISCONTINUED | OUTPATIENT
Start: 2024-05-03 | End: 2024-05-07

## 2024-05-03 RX ORDER — IOHEXOL 300 MG/ML
30 INJECTION, SOLUTION INTRAVENOUS ONCE
Refills: 0 | Status: COMPLETED | OUTPATIENT
Start: 2024-05-03 | End: 2024-05-03

## 2024-05-03 RX ORDER — PANTOPRAZOLE SODIUM 20 MG/1
40 TABLET, DELAYED RELEASE ORAL
Refills: 0 | Status: DISCONTINUED | OUTPATIENT
Start: 2024-05-03 | End: 2024-05-07

## 2024-05-03 RX ORDER — DIATRIZOATE MEGLUMINE 180 MG/ML
30 INJECTION, SOLUTION INTRAVESICAL ONCE
Refills: 0 | Status: DISCONTINUED | OUTPATIENT
Start: 2024-05-03 | End: 2024-05-06

## 2024-05-03 RX ORDER — MORPHINE SULFATE 50 MG/1
4 CAPSULE, EXTENDED RELEASE ORAL ONCE
Refills: 0 | Status: DISCONTINUED | OUTPATIENT
Start: 2024-05-03 | End: 2024-05-03

## 2024-05-03 RX ORDER — GLUCAGON INJECTION, SOLUTION 0.5 MG/.1ML
1 INJECTION, SOLUTION SUBCUTANEOUS ONCE
Refills: 0 | Status: DISCONTINUED | OUTPATIENT
Start: 2024-05-03 | End: 2024-05-07

## 2024-05-03 RX ORDER — ACETAMINOPHEN 500 MG
650 TABLET ORAL EVERY 6 HOURS
Refills: 0 | Status: DISCONTINUED | OUTPATIENT
Start: 2024-05-03 | End: 2024-05-07

## 2024-05-03 RX ORDER — DEXTROSE 50 % IN WATER 50 %
12.5 SYRINGE (ML) INTRAVENOUS ONCE
Refills: 0 | Status: DISCONTINUED | OUTPATIENT
Start: 2024-05-03 | End: 2024-05-07

## 2024-05-03 RX ORDER — SODIUM CHLORIDE 9 MG/ML
1000 INJECTION, SOLUTION INTRAVENOUS
Refills: 0 | Status: DISCONTINUED | OUTPATIENT
Start: 2024-05-03 | End: 2024-05-03

## 2024-05-03 RX ORDER — DEXTROSE 50 % IN WATER 50 %
15 SYRINGE (ML) INTRAVENOUS ONCE
Refills: 0 | Status: DISCONTINUED | OUTPATIENT
Start: 2024-05-03 | End: 2024-05-07

## 2024-05-03 RX ORDER — CHLORHEXIDINE GLUCONATE 213 G/1000ML
1 SOLUTION TOPICAL
Refills: 0 | Status: DISCONTINUED | OUTPATIENT
Start: 2024-05-03 | End: 2024-05-07

## 2024-05-03 RX ADMIN — MORPHINE SULFATE 4 MILLIGRAM(S): 50 CAPSULE, EXTENDED RELEASE ORAL at 08:12

## 2024-05-03 RX ADMIN — SODIUM CHLORIDE 100 MILLILITER(S): 9 INJECTION, SOLUTION INTRAVENOUS at 18:22

## 2024-05-03 RX ADMIN — IOHEXOL 30 MILLILITER(S): 300 INJECTION, SOLUTION INTRAVENOUS at 11:32

## 2024-05-03 RX ADMIN — SODIUM CHLORIDE 1000 MILLILITER(S): 9 INJECTION INTRAMUSCULAR; INTRAVENOUS; SUBCUTANEOUS at 08:12

## 2024-05-03 RX ADMIN — HEPARIN SODIUM 5000 UNIT(S): 5000 INJECTION INTRAVENOUS; SUBCUTANEOUS at 22:09

## 2024-05-03 NOTE — H&P ADULT - NSHPLABSRESULTS_GEN_ALL_CORE
MEDICATIONS  (PRN):      LAB/STUDIES:                        15.1   13.73 )-----------( 268      ( 03 May 2024 08:17 )             43.3     05-03    137  |  101  |  22<H>  ----------------------------<  153<H>  5.2<H>   |  23  |  1.3    Ca    9.5      03 May 2024 08:17    TPro  6.5  /  Alb  4.3  /  TBili  1.3<H>  /  DBili  x   /  AST  15  /  ALT  9   /  AlkPhos  107  05-03      LIVER FUNCTIONS - ( 03 May 2024 08:17 )  Alb: 4.3 g/dL / Pro: 6.5 g/dL / ALK PHOS: 107 U/L / ALT: 9 U/L / AST: 15 U/L / GGT: x           Urinalysis Basic - ( 03 May 2024 08:17 )        IMAGING:  IMPRESSION: Large right inguinal hernia containing the cecum, with   evidence of bowel obstruction at the level of the cecum, with dilatation   of the cecum in the hernia, and diffuse dilatation of all small bowel   loops proximal to the terminal ileum.    Small ascites in the right inguinal hernia.    Indeterminate right renal upper pole 1.4 cm hypodense lesion. Recommend   renal sonogram for further evaluation.    --- End of Report ---

## 2024-05-03 NOTE — H&P ADULT - NSHPPHYSICALEXAM_GEN_ALL_CORE
PHYSICAL EXAM:  General: NAD, AAOx3, calm and cooperative  HEENT: NCAT, ELSIE, EOMI, Trachea ML, Neck supple  Cardiac: RRR S1, S2, no Murmurs, rubs or gallops  Respiratory: CTAB, normal respiratory effort, breath sounds equal BL, no wheeze, rhonchi or crackles  Abdomen: Soft, non-distended, non-tender, no rebound, no guarding. +BS.  Inguinal: Right inguinal hernia, reducible, no skin changes  Musculoskeletal: Strength 5/5 BL UE/LE,   Neuro: Sensation grossly intact and equal throughout, no focal deficits  Vascular: Pulses 2+

## 2024-05-03 NOTE — ED PROVIDER NOTE - ATTENDING CONTRIBUTION TO CARE
73-year-old male with a past medical history significant for diabetes hypertension celiac disease former smoker symptomatic bradycardia status post pacemaker who presents with abdominal pain.  Patient states that for past couple days he has been having diffuse abdominal pain has been having trouble going to the bathroom.  Patient states for the last 3 days he has not been able to have a full bowel movement.  Patient denies any vomiting does endorse nausea  denies any diarrhea.  Patient denies any dizziness chest pain shortness of breath or any other medical complaints.    VITAL SIGNS: I have reviewed nursing notes and confirm.  CONSTITUTIONAL: non-toxic, well appearing  SKIN: no rash, no petechiae.  EYES: PERRL, EOMI, pink conjunctiva, anicteric  ENT: tongue midline, no exudates, MMM  NECK: Supple; no meningismus, no JVD  CARD: RRR, no murmurs, equal radial pulses bilaterally 2+  RESP: CTAB, no respiratory distress  ABD: Soft, non-tender, non-distended, no peritoneal signs, no HSM, no CVA tenderness  : R sided reducible inguinal hernia , no lesions, no testicular swelling/tenderness (exam performed by Dr. Alberts)  EXT: Normal ROM x4. No edema. No calves tenderness  NEURO: Alert, oriented. CN2-12 intact, equal strength bilaterally, nl gait.  PSYCH: Cooperative, appropriate.      73-year-old male that presents with abdominal pain labs imaging pain management reassess dispo pending

## 2024-05-03 NOTE — ED PROVIDER NOTE - PHYSICAL EXAMINATION
CONSTITUTIONAL:  in no acute distress.   SKIN: warm, dry  HEAD: Normocephalic; atraumatic.  EYES: PERRL, EOMI, normal sclera and conjunctiva   ENT: No nasal discharge; airway clear.  NECK: Supple; non tender.  CARD:  Regular rate and rhythm.   RESP: NO inc WOB   ABD: soft ntnd, Right-sided inguinal hernia reducible.  no erythema no discharge no tenderness  EXT: Normal ROM.    LYMPH: No acute cervical adenopathy.  NEURO: Alert, oriented, grossly unremarkable  PSYCH: Cooperative, appropriate.

## 2024-05-03 NOTE — ED ADULT TRIAGE NOTE - CHIEF COMPLAINT QUOTE
pt states he has abdominal pain and that he hasn't been able to have BM, pass gas x2 days, states that he is unable to eat, also states he feels dizzy when he stands up

## 2024-05-03 NOTE — H&P ADULT - ATTENDING COMMENTS
as above. Chronically incarcerated RIH presenting with obstructing symptoms. Hernia reduced. Will allow for partial obstruction to resolve and serial abdominal exams. Will schedule for RIH repair with mesh at next available OR time.  Case discussed with acute care surgery team who will be willing to take the patient in the interim if he does not improve.

## 2024-05-03 NOTE — H&P ADULT - ASSESSMENT
ASSESSMENT:  73yM with multiple comorbidities who presented with right reducible inguinal hernia. Physical exam findings, imaging, and labs as documented above.     Right inguinal hernia was reduced at bedside, without acute complications.     PLAN:  -No acute surgical intervention indicated at this moment  -Ok to have CLD as tolerated, if patient becomes nauseas, transition to NPO if vomit may place NGT  -Monitor vitals  -Serial abdominal examination  -Monitor for bowel function  -mIVF  -Zofran PRN   -Pain control as needed  -Re-start home meds   -Heparin TID   -Ambulate as tolerated.       Above plan discussed with Attending Surgeon Dr. Frank  , patient, patient family, and Primary team  05-03-24 @ 16:30

## 2024-05-03 NOTE — ED PROVIDER NOTE - CLINICAL SUMMARY MEDICAL DECISION MAKING FREE TEXT BOX
Pt with abd pain and difficulty with BM, found to have LBO.  surgery consulted who evaluated and accepted pt to their service.  will admit to surgery.  Any ordered labs and EKG were reviewed, Dr. Eun Alex, attending physician.  Any imaging was ordered and reviewed by me, Dr. Eun Alex, attending physician.  Appropriate medications for patient's presenting complaints were ordered and effects were reassessed.  Patient's records, if available,  (prior hospital, ED visit, and/or nursing home notes if available) were reviewed.  Additional history was obtained from EMS, family, and/or PCP (when available).  Escalation to admission/observation was considered. Patient requires inpatient hospitalization - monitored setting.

## 2024-05-03 NOTE — H&P ADULT - HISTORY OF PRESENT ILLNESS
GENERAL SURGERY CONSULT NOTE    Patient: MIKE GORDON , 73y ([birth year])Male   MRN: [id number]  Location: [facility] ED Hold 041 A  Visit: 05-03-24 Inpatient  Date: 05-03-24 @ 16:30    HPI:  73 year old male with medical history of HTN, DEONNA not on CPAP, celiac disease, Afib s/p ablation, Left kidney cancer s/p robotic nephrectomy (1/2024), chronic Right inguinal hernia (followed by Dr. Frank) who presented to the Emergency Department complaining of abdominal and right inguinal pain, associated with nauseas for the past 3 days, patient state is not passing flatus or having bowel movement since then. Patient state he was able to reduced his right inguinal hernia, but for the past 2 weeks he has not been able. Patient denies any fever, chills, shortness of breath, or chest pain. Workup in the ED include CT Large right inguinal hernia containing the cecum, with evidence of bowel obstruction at the level of the cecum, with dilatation of the cecum in the hernia, and diffuse dilatation of all small bowel loops proximal to the terminal ileum.      PAST MEDICAL & SURGICAL HISTORY:  History of morbid obesity  DEONNA (obstructive sleep apnea)  AV block, Mobitz 1  RBBB  H/O: HTN (hypertension)  GERD (gastroesophageal reflux disease)  Aortic stenosis  Micky disease  Chronic atrial fibrillation  S/P cholecystectomy  S/P debridement

## 2024-05-03 NOTE — ED ADULT NURSE NOTE - NSICDXPASTMEDICALHX_GEN_ALL_CORE_FT
PAST MEDICAL HISTORY:  Aortic stenosis     AV block, Mobitz 1     Chronic atrial fibrillation     Micky disease     GERD (gastroesophageal reflux disease)     H/O: HTN (hypertension)     History of morbid obesity     DEONNA (obstructive sleep apnea)     RBBB

## 2024-05-03 NOTE — ED PROVIDER NOTE - OBJECTIVE STATEMENT
73-year-old history of diabetes hypertension celiac's coming in here for right-sided abdominal pain.  Patient also states that he has had a hernia on that side.  Reducible.  Due for surgery.  No other complaints.

## 2024-05-04 LAB
A1C WITH ESTIMATED AVERAGE GLUCOSE RESULT: 5.7 % — HIGH (ref 4–5.6)
ANION GAP SERPL CALC-SCNC: 12 MMOL/L — SIGNIFICANT CHANGE UP (ref 7–14)
ANION GAP SERPL CALC-SCNC: 14 MMOL/L — SIGNIFICANT CHANGE UP (ref 7–14)
BASOPHILS # BLD AUTO: 0.03 K/UL — SIGNIFICANT CHANGE UP (ref 0–0.2)
BASOPHILS # BLD AUTO: 0.05 K/UL — SIGNIFICANT CHANGE UP (ref 0–0.2)
BASOPHILS NFR BLD AUTO: 0.3 % — SIGNIFICANT CHANGE UP (ref 0–1)
BASOPHILS NFR BLD AUTO: 0.7 % — SIGNIFICANT CHANGE UP (ref 0–1)
BUN SERPL-MCNC: 16 MG/DL — SIGNIFICANT CHANGE UP (ref 10–20)
BUN SERPL-MCNC: 18 MG/DL — SIGNIFICANT CHANGE UP (ref 10–20)
CALCIUM SERPL-MCNC: 8.7 MG/DL — SIGNIFICANT CHANGE UP (ref 8.4–10.5)
CALCIUM SERPL-MCNC: 9.2 MG/DL — SIGNIFICANT CHANGE UP (ref 8.4–10.5)
CHLORIDE SERPL-SCNC: 103 MMOL/L — SIGNIFICANT CHANGE UP (ref 98–110)
CHLORIDE SERPL-SCNC: 105 MMOL/L — SIGNIFICANT CHANGE UP (ref 98–110)
CO2 SERPL-SCNC: 23 MMOL/L — SIGNIFICANT CHANGE UP (ref 17–32)
CO2 SERPL-SCNC: 26 MMOL/L — SIGNIFICANT CHANGE UP (ref 17–32)
CREAT SERPL-MCNC: 1.1 MG/DL — SIGNIFICANT CHANGE UP (ref 0.7–1.5)
CREAT SERPL-MCNC: 1.3 MG/DL — SIGNIFICANT CHANGE UP (ref 0.7–1.5)
CULTURE RESULTS: NO GROWTH — SIGNIFICANT CHANGE UP
EGFR: 59 ML/MIN/1.73M2 — LOW
EGFR: 72 ML/MIN/1.73M2 — SIGNIFICANT CHANGE UP
EOSINOPHIL # BLD AUTO: 0.33 K/UL — SIGNIFICANT CHANGE UP (ref 0–0.7)
EOSINOPHIL # BLD AUTO: 0.51 K/UL — SIGNIFICANT CHANGE UP (ref 0–0.7)
EOSINOPHIL NFR BLD AUTO: 3.4 % — SIGNIFICANT CHANGE UP (ref 0–8)
EOSINOPHIL NFR BLD AUTO: 6.7 % — SIGNIFICANT CHANGE UP (ref 0–8)
ESTIMATED AVERAGE GLUCOSE: 117 MG/DL — HIGH (ref 68–114)
GLUCOSE BLDC GLUCOMTR-MCNC: 114 MG/DL — HIGH (ref 70–99)
GLUCOSE BLDC GLUCOMTR-MCNC: 120 MG/DL — HIGH (ref 70–99)
GLUCOSE BLDC GLUCOMTR-MCNC: 139 MG/DL — HIGH (ref 70–99)
GLUCOSE BLDC GLUCOMTR-MCNC: 173 MG/DL — HIGH (ref 70–99)
GLUCOSE SERPL-MCNC: 109 MG/DL — HIGH (ref 70–99)
GLUCOSE SERPL-MCNC: 168 MG/DL — HIGH (ref 70–99)
HCT VFR BLD CALC: 38.1 % — LOW (ref 42–52)
HCT VFR BLD CALC: 38.5 % — LOW (ref 42–52)
HGB BLD-MCNC: 13 G/DL — LOW (ref 14–18)
HGB BLD-MCNC: 13.4 G/DL — LOW (ref 14–18)
IMM GRANULOCYTES NFR BLD AUTO: 0.3 % — SIGNIFICANT CHANGE UP (ref 0.1–0.3)
IMM GRANULOCYTES NFR BLD AUTO: 0.4 % — HIGH (ref 0.1–0.3)
LYMPHOCYTES # BLD AUTO: 1.57 K/UL — SIGNIFICANT CHANGE UP (ref 1.2–3.4)
LYMPHOCYTES # BLD AUTO: 1.68 K/UL — SIGNIFICANT CHANGE UP (ref 1.2–3.4)
LYMPHOCYTES # BLD AUTO: 16.4 % — LOW (ref 20.5–51.1)
LYMPHOCYTES # BLD AUTO: 22.2 % — SIGNIFICANT CHANGE UP (ref 20.5–51.1)
MAGNESIUM SERPL-MCNC: 1.9 MG/DL — SIGNIFICANT CHANGE UP (ref 1.8–2.4)
MCHC RBC-ENTMCNC: 31.3 PG — HIGH (ref 27–31)
MCHC RBC-ENTMCNC: 31.6 PG — HIGH (ref 27–31)
MCHC RBC-ENTMCNC: 34.1 G/DL — SIGNIFICANT CHANGE UP (ref 32–37)
MCHC RBC-ENTMCNC: 34.8 G/DL — SIGNIFICANT CHANGE UP (ref 32–37)
MCV RBC AUTO: 90.8 FL — SIGNIFICANT CHANGE UP (ref 80–94)
MCV RBC AUTO: 91.6 FL — SIGNIFICANT CHANGE UP (ref 80–94)
MONOCYTES # BLD AUTO: 0.47 K/UL — SIGNIFICANT CHANGE UP (ref 0.1–0.6)
MONOCYTES # BLD AUTO: 0.63 K/UL — HIGH (ref 0.1–0.6)
MONOCYTES NFR BLD AUTO: 6.2 % — SIGNIFICANT CHANGE UP (ref 1.7–9.3)
MONOCYTES NFR BLD AUTO: 6.6 % — SIGNIFICANT CHANGE UP (ref 1.7–9.3)
NEUTROPHILS # BLD AUTO: 4.83 K/UL — SIGNIFICANT CHANGE UP (ref 1.4–6.5)
NEUTROPHILS # BLD AUTO: 7 K/UL — HIGH (ref 1.4–6.5)
NEUTROPHILS NFR BLD AUTO: 63.9 % — SIGNIFICANT CHANGE UP (ref 42.2–75.2)
NEUTROPHILS NFR BLD AUTO: 72.9 % — SIGNIFICANT CHANGE UP (ref 42.2–75.2)
NRBC # BLD: 0 /100 WBCS — SIGNIFICANT CHANGE UP (ref 0–0)
NRBC # BLD: 0 /100 WBCS — SIGNIFICANT CHANGE UP (ref 0–0)
PHOSPHATE SERPL-MCNC: 3 MG/DL — SIGNIFICANT CHANGE UP (ref 2.1–4.9)
PLATELET # BLD AUTO: 249 K/UL — SIGNIFICANT CHANGE UP (ref 130–400)
PLATELET # BLD AUTO: 258 K/UL — SIGNIFICANT CHANGE UP (ref 130–400)
PMV BLD: 10.6 FL — HIGH (ref 7.4–10.4)
PMV BLD: 10.8 FL — HIGH (ref 7.4–10.4)
POTASSIUM SERPL-MCNC: 4.7 MMOL/L — SIGNIFICANT CHANGE UP (ref 3.5–5)
POTASSIUM SERPL-MCNC: 4.7 MMOL/L — SIGNIFICANT CHANGE UP (ref 3.5–5)
POTASSIUM SERPL-SCNC: 4.7 MMOL/L — SIGNIFICANT CHANGE UP (ref 3.5–5)
POTASSIUM SERPL-SCNC: 4.7 MMOL/L — SIGNIFICANT CHANGE UP (ref 3.5–5)
RBC # BLD: 4.16 M/UL — LOW (ref 4.7–6.1)
RBC # BLD: 4.24 M/UL — LOW (ref 4.7–6.1)
RBC # FLD: 13 % — SIGNIFICANT CHANGE UP (ref 11.5–14.5)
RBC # FLD: 13.1 % — SIGNIFICANT CHANGE UP (ref 11.5–14.5)
SODIUM SERPL-SCNC: 141 MMOL/L — SIGNIFICANT CHANGE UP (ref 135–146)
SODIUM SERPL-SCNC: 142 MMOL/L — SIGNIFICANT CHANGE UP (ref 135–146)
SPECIMEN SOURCE: SIGNIFICANT CHANGE UP
WBC # BLD: 7.56 K/UL — SIGNIFICANT CHANGE UP (ref 4.8–10.8)
WBC # BLD: 9.6 K/UL — SIGNIFICANT CHANGE UP (ref 4.8–10.8)
WBC # FLD AUTO: 7.56 K/UL — SIGNIFICANT CHANGE UP (ref 4.8–10.8)
WBC # FLD AUTO: 9.6 K/UL — SIGNIFICANT CHANGE UP (ref 4.8–10.8)

## 2024-05-04 PROCEDURE — 74018 RADEX ABDOMEN 1 VIEW: CPT | Mod: 26

## 2024-05-04 PROCEDURE — 99232 SBSQ HOSP IP/OBS MODERATE 35: CPT

## 2024-05-04 RX ADMIN — Medication 81 MILLIGRAM(S): at 11:40

## 2024-05-04 RX ADMIN — Medication 650 MILLIGRAM(S): at 01:45

## 2024-05-04 RX ADMIN — Medication 650 MILLIGRAM(S): at 01:09

## 2024-05-04 RX ADMIN — HEPARIN SODIUM 5000 UNIT(S): 5000 INJECTION INTRAVENOUS; SUBCUTANEOUS at 06:15

## 2024-05-04 RX ADMIN — Medication 400 UNIT(S): at 11:40

## 2024-05-04 RX ADMIN — AMLODIPINE BESYLATE 10 MILLIGRAM(S): 2.5 TABLET ORAL at 06:14

## 2024-05-04 RX ADMIN — HEPARIN SODIUM 5000 UNIT(S): 5000 INJECTION INTRAVENOUS; SUBCUTANEOUS at 21:02

## 2024-05-04 RX ADMIN — SODIUM CHLORIDE 100 MILLILITER(S): 9 INJECTION, SOLUTION INTRAVENOUS at 11:45

## 2024-05-04 RX ADMIN — HEPARIN SODIUM 5000 UNIT(S): 5000 INJECTION INTRAVENOUS; SUBCUTANEOUS at 14:20

## 2024-05-04 RX ADMIN — Medication 2 MILLIGRAM(S): at 06:14

## 2024-05-04 RX ADMIN — PANTOPRAZOLE SODIUM 40 MILLIGRAM(S): 20 TABLET, DELAYED RELEASE ORAL at 06:14

## 2024-05-04 NOTE — PATIENT PROFILE ADULT - FALL HARM RISK - HARM RISK INTERVENTIONS

## 2024-05-04 NOTE — PATIENT PROFILE ADULT - FUNCTIONAL ASSESSMENT - BASIC MOBILITY 6.
4-calculated by average/Not able to assess (calculate score using [facility] averaging method)

## 2024-05-05 LAB
ANION GAP SERPL CALC-SCNC: 10 MMOL/L — SIGNIFICANT CHANGE UP (ref 7–14)
BUN SERPL-MCNC: 17 MG/DL — SIGNIFICANT CHANGE UP (ref 10–20)
CALCIUM SERPL-MCNC: 9 MG/DL — SIGNIFICANT CHANGE UP (ref 8.4–10.4)
CHLORIDE SERPL-SCNC: 103 MMOL/L — SIGNIFICANT CHANGE UP (ref 98–110)
CO2 SERPL-SCNC: 25 MMOL/L — SIGNIFICANT CHANGE UP (ref 17–32)
CREAT SERPL-MCNC: 1.2 MG/DL — SIGNIFICANT CHANGE UP (ref 0.7–1.5)
EGFR: 65 ML/MIN/1.73M2 — SIGNIFICANT CHANGE UP
GLUCOSE BLDC GLUCOMTR-MCNC: 103 MG/DL — HIGH (ref 70–99)
GLUCOSE BLDC GLUCOMTR-MCNC: 115 MG/DL — HIGH (ref 70–99)
GLUCOSE BLDC GLUCOMTR-MCNC: 174 MG/DL — HIGH (ref 70–99)
GLUCOSE SERPL-MCNC: 137 MG/DL — HIGH (ref 70–99)
HCT VFR BLD CALC: 38.5 % — LOW (ref 42–52)
HGB BLD-MCNC: 13 G/DL — LOW (ref 14–18)
MAGNESIUM SERPL-MCNC: 1.8 MG/DL — SIGNIFICANT CHANGE UP (ref 1.8–2.4)
MCHC RBC-ENTMCNC: 30.8 PG — SIGNIFICANT CHANGE UP (ref 27–31)
MCHC RBC-ENTMCNC: 33.8 G/DL — SIGNIFICANT CHANGE UP (ref 32–37)
MCV RBC AUTO: 91.2 FL — SIGNIFICANT CHANGE UP (ref 80–94)
NRBC # BLD: 0 /100 WBCS — SIGNIFICANT CHANGE UP (ref 0–0)
PHOSPHATE SERPL-MCNC: 3.4 MG/DL — SIGNIFICANT CHANGE UP (ref 2.1–4.9)
PLATELET # BLD AUTO: 276 K/UL — SIGNIFICANT CHANGE UP (ref 130–400)
PMV BLD: 10.4 FL — SIGNIFICANT CHANGE UP (ref 7.4–10.4)
POTASSIUM SERPL-MCNC: 4.4 MMOL/L — SIGNIFICANT CHANGE UP (ref 3.5–5)
POTASSIUM SERPL-SCNC: 4.4 MMOL/L — SIGNIFICANT CHANGE UP (ref 3.5–5)
RBC # BLD: 4.22 M/UL — LOW (ref 4.7–6.1)
RBC # FLD: 12.8 % — SIGNIFICANT CHANGE UP (ref 11.5–14.5)
SODIUM SERPL-SCNC: 138 MMOL/L — SIGNIFICANT CHANGE UP (ref 135–146)
WBC # BLD: 10.05 K/UL — SIGNIFICANT CHANGE UP (ref 4.8–10.8)
WBC # FLD AUTO: 10.05 K/UL — SIGNIFICANT CHANGE UP (ref 4.8–10.8)

## 2024-05-05 PROCEDURE — 99232 SBSQ HOSP IP/OBS MODERATE 35: CPT

## 2024-05-05 RX ADMIN — Medication 81 MILLIGRAM(S): at 12:10

## 2024-05-05 RX ADMIN — HEPARIN SODIUM 5000 UNIT(S): 5000 INJECTION INTRAVENOUS; SUBCUTANEOUS at 21:27

## 2024-05-05 RX ADMIN — HEPARIN SODIUM 5000 UNIT(S): 5000 INJECTION INTRAVENOUS; SUBCUTANEOUS at 14:04

## 2024-05-05 RX ADMIN — PANTOPRAZOLE SODIUM 40 MILLIGRAM(S): 20 TABLET, DELAYED RELEASE ORAL at 05:09

## 2024-05-05 RX ADMIN — AMLODIPINE BESYLATE 10 MILLIGRAM(S): 2.5 TABLET ORAL at 05:09

## 2024-05-05 RX ADMIN — Medication 400 UNIT(S): at 14:04

## 2024-05-05 RX ADMIN — Medication 2 MILLIGRAM(S): at 05:09

## 2024-05-05 RX ADMIN — Medication 650 MILLIGRAM(S): at 07:34

## 2024-05-05 RX ADMIN — CHLORHEXIDINE GLUCONATE 1 APPLICATION(S): 213 SOLUTION TOPICAL at 05:09

## 2024-05-05 RX ADMIN — HEPARIN SODIUM 5000 UNIT(S): 5000 INJECTION INTRAVENOUS; SUBCUTANEOUS at 05:09

## 2024-05-06 LAB
ANION GAP SERPL CALC-SCNC: 11 MMOL/L — SIGNIFICANT CHANGE UP (ref 7–14)
APTT BLD: 33.8 SEC — SIGNIFICANT CHANGE UP (ref 27–39.2)
BUN SERPL-MCNC: 17 MG/DL — SIGNIFICANT CHANGE UP (ref 10–20)
CALCIUM SERPL-MCNC: 9.6 MG/DL — SIGNIFICANT CHANGE UP (ref 8.4–10.4)
CHLORIDE SERPL-SCNC: 103 MMOL/L — SIGNIFICANT CHANGE UP (ref 98–110)
CO2 SERPL-SCNC: 27 MMOL/L — SIGNIFICANT CHANGE UP (ref 17–32)
CREAT SERPL-MCNC: 1.3 MG/DL — SIGNIFICANT CHANGE UP (ref 0.7–1.5)
EGFR: 59 ML/MIN/1.73M2 — LOW
GLUCOSE BLDC GLUCOMTR-MCNC: 118 MG/DL — HIGH (ref 70–99)
GLUCOSE BLDC GLUCOMTR-MCNC: 128 MG/DL — HIGH (ref 70–99)
GLUCOSE BLDC GLUCOMTR-MCNC: 134 MG/DL — HIGH (ref 70–99)
GLUCOSE BLDC GLUCOMTR-MCNC: 137 MG/DL — HIGH (ref 70–99)
GLUCOSE SERPL-MCNC: 94 MG/DL — SIGNIFICANT CHANGE UP (ref 70–99)
HCT VFR BLD CALC: 43 % — SIGNIFICANT CHANGE UP (ref 42–52)
HGB BLD-MCNC: 14.4 G/DL — SIGNIFICANT CHANGE UP (ref 14–18)
INR BLD: 1.1 RATIO — SIGNIFICANT CHANGE UP (ref 0.65–1.3)
MAGNESIUM SERPL-MCNC: 2 MG/DL — SIGNIFICANT CHANGE UP (ref 1.8–2.4)
MCHC RBC-ENTMCNC: 30.7 PG — SIGNIFICANT CHANGE UP (ref 27–31)
MCHC RBC-ENTMCNC: 33.5 G/DL — SIGNIFICANT CHANGE UP (ref 32–37)
MCV RBC AUTO: 91.7 FL — SIGNIFICANT CHANGE UP (ref 80–94)
NRBC # BLD: 0 /100 WBCS — SIGNIFICANT CHANGE UP (ref 0–0)
PHOSPHATE SERPL-MCNC: 3.8 MG/DL — SIGNIFICANT CHANGE UP (ref 2.1–4.9)
PLATELET # BLD AUTO: 311 K/UL — SIGNIFICANT CHANGE UP (ref 130–400)
PMV BLD: 10.6 FL — HIGH (ref 7.4–10.4)
POTASSIUM SERPL-MCNC: 5.2 MMOL/L — HIGH (ref 3.5–5)
POTASSIUM SERPL-SCNC: 5.2 MMOL/L — HIGH (ref 3.5–5)
PROTHROM AB SERPL-ACNC: 12.5 SEC — SIGNIFICANT CHANGE UP (ref 9.95–12.87)
RBC # BLD: 4.69 M/UL — LOW (ref 4.7–6.1)
RBC # FLD: 13 % — SIGNIFICANT CHANGE UP (ref 11.5–14.5)
SODIUM SERPL-SCNC: 141 MMOL/L — SIGNIFICANT CHANGE UP (ref 135–146)
WBC # BLD: 9.15 K/UL — SIGNIFICANT CHANGE UP (ref 4.8–10.8)
WBC # FLD AUTO: 9.15 K/UL — SIGNIFICANT CHANGE UP (ref 4.8–10.8)

## 2024-05-06 PROCEDURE — 99232 SBSQ HOSP IP/OBS MODERATE 35: CPT | Mod: 57

## 2024-05-06 RX ORDER — SODIUM CHLORIDE 9 MG/ML
1000 INJECTION, SOLUTION INTRAVENOUS
Refills: 0 | Status: DISCONTINUED | OUTPATIENT
Start: 2024-05-06 | End: 2024-05-07

## 2024-05-06 RX ORDER — SODIUM CHLORIDE 9 MG/ML
1000 INJECTION, SOLUTION INTRAVENOUS
Refills: 0 | Status: DISCONTINUED | OUTPATIENT
Start: 2024-05-06 | End: 2024-05-06

## 2024-05-06 RX ORDER — MAGNESIUM SULFATE 500 MG/ML
2 VIAL (ML) INJECTION ONCE
Refills: 0 | Status: COMPLETED | OUTPATIENT
Start: 2024-05-06 | End: 2024-05-06

## 2024-05-06 RX ORDER — KETOROLAC TROMETHAMINE 30 MG/ML
15 SYRINGE (ML) INJECTION EVERY 8 HOURS
Refills: 0 | Status: DISCONTINUED | OUTPATIENT
Start: 2024-05-06 | End: 2024-05-07

## 2024-05-06 RX ADMIN — Medication 2 MILLIGRAM(S): at 05:10

## 2024-05-06 RX ADMIN — HEPARIN SODIUM 5000 UNIT(S): 5000 INJECTION INTRAVENOUS; SUBCUTANEOUS at 21:21

## 2024-05-06 RX ADMIN — Medication 81 MILLIGRAM(S): at 12:22

## 2024-05-06 RX ADMIN — HEPARIN SODIUM 5000 UNIT(S): 5000 INJECTION INTRAVENOUS; SUBCUTANEOUS at 05:09

## 2024-05-06 RX ADMIN — Medication 15 MILLIGRAM(S): at 14:21

## 2024-05-06 RX ADMIN — Medication 650 MILLIGRAM(S): at 08:39

## 2024-05-06 RX ADMIN — CHLORHEXIDINE GLUCONATE 1 APPLICATION(S): 213 SOLUTION TOPICAL at 05:11

## 2024-05-06 RX ADMIN — HEPARIN SODIUM 5000 UNIT(S): 5000 INJECTION INTRAVENOUS; SUBCUTANEOUS at 12:22

## 2024-05-06 RX ADMIN — AMLODIPINE BESYLATE 10 MILLIGRAM(S): 2.5 TABLET ORAL at 05:10

## 2024-05-06 RX ADMIN — PANTOPRAZOLE SODIUM 40 MILLIGRAM(S): 20 TABLET, DELAYED RELEASE ORAL at 05:10

## 2024-05-06 RX ADMIN — SODIUM CHLORIDE 125 MILLILITER(S): 9 INJECTION, SOLUTION INTRAVENOUS at 08:39

## 2024-05-06 RX ADMIN — Medication 400 UNIT(S): at 12:22

## 2024-05-06 NOTE — PROGRESS NOTE ADULT - ATTENDING COMMENTS
as above. Doing well. No obstructive symptoms but still with symptomatic RIH. Will plan for RIH with mesh tomorrow. Risk and benefits of the procedure were discussed with the patient and he agreed to proceed.

## 2024-05-07 LAB
ANION GAP SERPL CALC-SCNC: 13 MMOL/L — SIGNIFICANT CHANGE UP (ref 7–14)
ANION GAP SERPL CALC-SCNC: 8 MMOL/L — SIGNIFICANT CHANGE UP (ref 7–14)
BUN SERPL-MCNC: 19 MG/DL — SIGNIFICANT CHANGE UP (ref 10–20)
BUN SERPL-MCNC: 19 MG/DL — SIGNIFICANT CHANGE UP (ref 10–20)
CALCIUM SERPL-MCNC: 8.8 MG/DL — SIGNIFICANT CHANGE UP (ref 8.4–10.4)
CALCIUM SERPL-MCNC: 9.1 MG/DL — SIGNIFICANT CHANGE UP (ref 8.4–10.5)
CHLORIDE SERPL-SCNC: 101 MMOL/L — SIGNIFICANT CHANGE UP (ref 98–110)
CHLORIDE SERPL-SCNC: 103 MMOL/L — SIGNIFICANT CHANGE UP (ref 98–110)
CO2 SERPL-SCNC: 21 MMOL/L — SIGNIFICANT CHANGE UP (ref 17–32)
CO2 SERPL-SCNC: 26 MMOL/L — SIGNIFICANT CHANGE UP (ref 17–32)
CREAT SERPL-MCNC: 1.3 MG/DL — SIGNIFICANT CHANGE UP (ref 0.7–1.5)
CREAT SERPL-MCNC: 1.3 MG/DL — SIGNIFICANT CHANGE UP (ref 0.7–1.5)
EGFR: 59 ML/MIN/1.73M2 — LOW
EGFR: 59 ML/MIN/1.73M2 — LOW
GLUCOSE BLDC GLUCOMTR-MCNC: 113 MG/DL — HIGH (ref 70–99)
GLUCOSE BLDC GLUCOMTR-MCNC: 128 MG/DL — HIGH (ref 70–99)
GLUCOSE BLDC GLUCOMTR-MCNC: 132 MG/DL — HIGH (ref 70–99)
GLUCOSE BLDC GLUCOMTR-MCNC: 159 MG/DL — HIGH (ref 70–99)
GLUCOSE BLDC GLUCOMTR-MCNC: 247 MG/DL — HIGH (ref 70–99)
GLUCOSE SERPL-MCNC: 120 MG/DL — HIGH (ref 70–99)
GLUCOSE SERPL-MCNC: 223 MG/DL — HIGH (ref 70–99)
HCT VFR BLD CALC: 39.4 % — LOW (ref 42–52)
HGB BLD-MCNC: 13.3 G/DL — LOW (ref 14–18)
MAGNESIUM SERPL-MCNC: 1.6 MG/DL — LOW (ref 1.8–2.4)
MCHC RBC-ENTMCNC: 31.2 PG — HIGH (ref 27–31)
MCHC RBC-ENTMCNC: 33.8 G/DL — SIGNIFICANT CHANGE UP (ref 32–37)
MCV RBC AUTO: 92.5 FL — SIGNIFICANT CHANGE UP (ref 80–94)
NRBC # BLD: 0 /100 WBCS — SIGNIFICANT CHANGE UP (ref 0–0)
PHOSPHATE SERPL-MCNC: 3.7 MG/DL — SIGNIFICANT CHANGE UP (ref 2.1–4.9)
PLATELET # BLD AUTO: 270 K/UL — SIGNIFICANT CHANGE UP (ref 130–400)
PMV BLD: 10.6 FL — HIGH (ref 7.4–10.4)
POTASSIUM SERPL-MCNC: 4.4 MMOL/L — SIGNIFICANT CHANGE UP (ref 3.5–5)
POTASSIUM SERPL-MCNC: 4.9 MMOL/L — SIGNIFICANT CHANGE UP (ref 3.5–5)
POTASSIUM SERPL-SCNC: 4.4 MMOL/L — SIGNIFICANT CHANGE UP (ref 3.5–5)
POTASSIUM SERPL-SCNC: 4.9 MMOL/L — SIGNIFICANT CHANGE UP (ref 3.5–5)
RBC # BLD: 4.26 M/UL — LOW (ref 4.7–6.1)
RBC # FLD: 13.1 % — SIGNIFICANT CHANGE UP (ref 11.5–14.5)
SODIUM SERPL-SCNC: 135 MMOL/L — SIGNIFICANT CHANGE UP (ref 135–146)
SODIUM SERPL-SCNC: 137 MMOL/L — SIGNIFICANT CHANGE UP (ref 135–146)
WBC # BLD: 13.97 K/UL — HIGH (ref 4.8–10.8)
WBC # FLD AUTO: 13.97 K/UL — HIGH (ref 4.8–10.8)

## 2024-05-07 PROCEDURE — 49507 PRP I/HERN INIT BLOCK >5 YR: CPT

## 2024-05-07 RX ORDER — MAGNESIUM SULFATE 500 MG/ML
2 VIAL (ML) INJECTION ONCE
Refills: 0 | Status: COMPLETED | OUTPATIENT
Start: 2024-05-07 | End: 2024-05-07

## 2024-05-07 RX ORDER — HYDROMORPHONE HYDROCHLORIDE 2 MG/ML
1 INJECTION INTRAMUSCULAR; INTRAVENOUS; SUBCUTANEOUS
Refills: 0 | Status: DISCONTINUED | OUTPATIENT
Start: 2024-05-07 | End: 2024-05-08

## 2024-05-07 RX ORDER — SODIUM CHLORIDE 9 MG/ML
1000 INJECTION, SOLUTION INTRAVENOUS
Refills: 0 | Status: DISCONTINUED | OUTPATIENT
Start: 2024-05-07 | End: 2024-05-08

## 2024-05-07 RX ORDER — OXYCODONE AND ACETAMINOPHEN 5; 325 MG/1; MG/1
1 TABLET ORAL ONCE
Refills: 0 | Status: DISCONTINUED | OUTPATIENT
Start: 2024-05-07 | End: 2024-05-08

## 2024-05-07 RX ORDER — HYDROMORPHONE HYDROCHLORIDE 2 MG/ML
0.5 INJECTION INTRAMUSCULAR; INTRAVENOUS; SUBCUTANEOUS
Refills: 0 | Status: DISCONTINUED | OUTPATIENT
Start: 2024-05-07 | End: 2024-05-08

## 2024-05-07 RX ORDER — DEXTROSE 50 % IN WATER 50 %
15 SYRINGE (ML) INTRAVENOUS ONCE
Refills: 0 | Status: DISCONTINUED | OUTPATIENT
Start: 2024-05-07 | End: 2024-05-08

## 2024-05-07 RX ORDER — HEPARIN SODIUM 5000 [USP'U]/ML
5000 INJECTION INTRAVENOUS; SUBCUTANEOUS EVERY 8 HOURS
Refills: 0 | Status: DISCONTINUED | OUTPATIENT
Start: 2024-05-07 | End: 2024-05-08

## 2024-05-07 RX ORDER — GLUCAGON INJECTION, SOLUTION 0.5 MG/.1ML
1 INJECTION, SOLUTION SUBCUTANEOUS ONCE
Refills: 0 | Status: DISCONTINUED | OUTPATIENT
Start: 2024-05-07 | End: 2024-05-08

## 2024-05-07 RX ORDER — CHOLECALCIFEROL (VITAMIN D3) 125 MCG
400 CAPSULE ORAL DAILY
Refills: 0 | Status: DISCONTINUED | OUTPATIENT
Start: 2024-05-07 | End: 2024-05-08

## 2024-05-07 RX ORDER — AMLODIPINE BESYLATE 2.5 MG/1
10 TABLET ORAL DAILY
Refills: 0 | Status: DISCONTINUED | OUTPATIENT
Start: 2024-05-07 | End: 2024-05-08

## 2024-05-07 RX ORDER — DEXTROSE 50 % IN WATER 50 %
12.5 SYRINGE (ML) INTRAVENOUS ONCE
Refills: 0 | Status: DISCONTINUED | OUTPATIENT
Start: 2024-05-07 | End: 2024-05-08

## 2024-05-07 RX ORDER — DEXTROSE 50 % IN WATER 50 %
25 SYRINGE (ML) INTRAVENOUS ONCE
Refills: 0 | Status: DISCONTINUED | OUTPATIENT
Start: 2024-05-07 | End: 2024-05-08

## 2024-05-07 RX ORDER — PANTOPRAZOLE SODIUM 20 MG/1
40 TABLET, DELAYED RELEASE ORAL
Refills: 0 | Status: DISCONTINUED | OUTPATIENT
Start: 2024-05-07 | End: 2024-05-08

## 2024-05-07 RX ORDER — CHLORHEXIDINE GLUCONATE 213 G/1000ML
1 SOLUTION TOPICAL
Refills: 0 | Status: DISCONTINUED | OUTPATIENT
Start: 2024-05-07 | End: 2024-05-08

## 2024-05-07 RX ORDER — DOXAZOSIN MESYLATE 4 MG
2 TABLET ORAL DAILY
Refills: 0 | Status: DISCONTINUED | OUTPATIENT
Start: 2024-05-07 | End: 2024-05-08

## 2024-05-07 RX ORDER — KETOROLAC TROMETHAMINE 30 MG/ML
15 SYRINGE (ML) INJECTION EVERY 8 HOURS
Refills: 0 | Status: DISCONTINUED | OUTPATIENT
Start: 2024-05-07 | End: 2024-05-08

## 2024-05-07 RX ORDER — INSULIN LISPRO 100/ML
VIAL (ML) SUBCUTANEOUS
Refills: 0 | Status: DISCONTINUED | OUTPATIENT
Start: 2024-05-07 | End: 2024-05-08

## 2024-05-07 RX ORDER — ACETAMINOPHEN 500 MG
650 TABLET ORAL EVERY 6 HOURS
Refills: 0 | Status: DISCONTINUED | OUTPATIENT
Start: 2024-05-07 | End: 2024-05-08

## 2024-05-07 RX ORDER — ONDANSETRON 8 MG/1
4 TABLET, FILM COATED ORAL ONCE
Refills: 0 | Status: DISCONTINUED | OUTPATIENT
Start: 2024-05-07 | End: 2024-05-08

## 2024-05-07 RX ADMIN — HEPARIN SODIUM 5000 UNIT(S): 5000 INJECTION INTRAVENOUS; SUBCUTANEOUS at 06:04

## 2024-05-07 RX ADMIN — SODIUM CHLORIDE 125 MILLILITER(S): 9 INJECTION, SOLUTION INTRAVENOUS at 06:04

## 2024-05-07 RX ADMIN — HEPARIN SODIUM 5000 UNIT(S): 5000 INJECTION INTRAVENOUS; SUBCUTANEOUS at 21:31

## 2024-05-07 RX ADMIN — Medication 2: at 16:57

## 2024-05-07 RX ADMIN — PANTOPRAZOLE SODIUM 40 MILLIGRAM(S): 20 TABLET, DELAYED RELEASE ORAL at 08:29

## 2024-05-07 RX ADMIN — CHLORHEXIDINE GLUCONATE 1 APPLICATION(S): 213 SOLUTION TOPICAL at 06:04

## 2024-05-07 RX ADMIN — Medication 15 MILLIGRAM(S): at 16:56

## 2024-05-07 RX ADMIN — AMLODIPINE BESYLATE 10 MILLIGRAM(S): 2.5 TABLET ORAL at 06:04

## 2024-05-07 RX ADMIN — Medication 2 MILLIGRAM(S): at 06:35

## 2024-05-07 RX ADMIN — Medication 15 MILLIGRAM(S): at 17:10

## 2024-05-07 RX ADMIN — SODIUM CHLORIDE 100 MILLILITER(S): 9 INJECTION, SOLUTION INTRAVENOUS at 16:57

## 2024-05-07 NOTE — BRIEF OPERATIVE NOTE - NSICDXBRIEFPROCEDURE_GEN_ALL_CORE_FT
PROCEDURES:  Repair, hernia, inguinal, open, using mesh, adult 07-May-2024 14:48:52 right sided Vika Dumont

## 2024-05-07 NOTE — CHART NOTE - NSCHARTNOTEFT_GEN_A_CORE
Post Operative Note  Patient: MIKE GORDON 73y (1950) Male   MRN: [id number]  Location: [address]  Visit: 05-03-24 Inpatient  Date: 05-07-24 @ 23:19    Procedure: Right inguinal hernia     S/P Repair, hernia, inguinal, open, using mesh, adult    Subjective:   Nausea: no, Vomiting: no, Ambulating: yes, Flatus: no  Pain Assessment: Patient is complaining of mild incisional pain that is appropriate for post-operative course.     Objective:  Vitals: T(F): 98.1 (05-07-24 @ 20:00), Max: 98.4 (05-07-24 @ 09:42)  HR: 78 (05-07-24 @ 20:00)  BP: 112/70 (05-07-24 @ 20:00) (91/53 - 129/93)  RR: 18 (05-07-24 @ 20:00)  SpO2: 97% (05-07-24 @ 20:00)    In:   05-07-24 @ 07:01  -  05-07-24 @ 23:19  --------------------------------------------------------  IN: 0 mL      IV Fluids: cholecalciferol 400 Unit(s) Oral daily  lactated ringers. 1000 milliLiter(s) (100 mL/Hr) IV Continuous <Continuous>      Out:   05-07-24 @ 07:01  -  05-07-24 @ 23:19  --------------------------------------------------------  OUT: 600 mL    Voided Urine:   05-07-24 @ 07:01  -  05-07-24 @ 23:19  --------------------------------------------------------  OUT: 600 mL      He Catheter: no     Physical Examination:  General Appearance: NAD  HEENT: EOMI, sclera non-icteric.  Heart: RRR  Lungs: CTABL  Abdomen:  Soft, Mild incisional tenderness, appropriate for post-op course, nondistended. No rigidity, guarding, or rebound tenderness.   MSK/Extremities: Warm & well-perfused. Peripheral pulses intact.  Skin: Warm, dry. No jaundice.   Incisions/Wounds: Dressings in place, clean, dry and intact, no signs of infection/active bleeding/drainage    Medications: [Standing]  acetaminophen     Tablet .. 650 milliGRAM(s) Oral every 6 hours PRN  amLODIPine   Tablet 10 milliGRAM(s) Oral daily  chlorhexidine 2% Cloths 1 Application(s) Topical <User Schedule>  cholecalciferol 400 Unit(s) Oral daily  dextrose 50% Injectable 12.5 Gram(s) IV Push once  dextrose 50% Injectable 25 Gram(s) IV Push once  dextrose Oral Gel 15 Gram(s) Oral once PRN  doxazosin 2 milliGRAM(s) Oral daily  glucagon  Injectable 1 milliGRAM(s) IntraMuscular once  heparin   Injectable 5000 Unit(s) SubCutaneous every 8 hours  HYDROmorphone  Injectable 1 milliGRAM(s) IV Push every 10 minutes PRN  HYDROmorphone  Injectable 0.5 milliGRAM(s) IV Push every 10 minutes PRN  insulin lispro (ADMELOG) corrective regimen sliding scale   SubCutaneous three times a day before meals  ketorolac   Injectable 15 milliGRAM(s) IV Push every 8 hours PRN  lactated ringers. 1000 milliLiter(s) IV Continuous <Continuous>  ondansetron Injectable 4 milliGRAM(s) IV Push once PRN  oxycodone    5 mG/acetaminophen 325 mG 1 Tablet(s) Oral once PRN  pantoprazole    Tablet 40 milliGRAM(s) Oral before breakfast    Medications: [PRN]  acetaminophen     Tablet .. 650 milliGRAM(s) Oral every 6 hours PRN  amLODIPine   Tablet 10 milliGRAM(s) Oral daily  chlorhexidine 2% Cloths 1 Application(s) Topical <User Schedule>  cholecalciferol 400 Unit(s) Oral daily  dextrose 50% Injectable 25 Gram(s) IV Push once  dextrose 50% Injectable 12.5 Gram(s) IV Push once  dextrose Oral Gel 15 Gram(s) Oral once PRN  doxazosin 2 milliGRAM(s) Oral daily  glucagon  Injectable 1 milliGRAM(s) IntraMuscular once  heparin   Injectable 5000 Unit(s) SubCutaneous every 8 hours  HYDROmorphone  Injectable 1 milliGRAM(s) IV Push every 10 minutes PRN  HYDROmorphone  Injectable 0.5 milliGRAM(s) IV Push every 10 minutes PRN  insulin lispro (ADMELOG) corrective regimen sliding scale   SubCutaneous three times a day before meals  ketorolac   Injectable 15 milliGRAM(s) IV Push every 8 hours PRN  lactated ringers. 1000 milliLiter(s) IV Continuous <Continuous>  ondansetron Injectable 4 milliGRAM(s) IV Push once PRN  oxycodone    5 mG/acetaminophen 325 mG 1 Tablet(s) Oral once PRN  pantoprazole    Tablet 40 milliGRAM(s) Oral before breakfast      DVT PROPHYLAXIS: heparin   Injectable 5000 Unit(s) SubCutaneous every 8 hours    GI PROPHYLAXIS: pantoprazole    Tablet 40 milliGRAM(s) Oral before breakfast    ANTICOAGULATION:   ANTIBIOTICS:        Labs:                        13.3   13.97 )-----------( 270      ( 07 May 2024 20:35 )             39.4     05-07    137  |  103  |  19  ----------------------------<  223<H>  4.9   |  21  |  1.3    Ca    8.8      07 May 2024 20:35  Phos  3.7     05-07  Mg     1.6     05-07      PT/INR - ( 06 May 2024 17:01 )   PT: 12.50 sec;   INR: 1.10 ratio         PTT - ( 06 May 2024 17:01 )  PTT:33.8 sec        Imaging:  No post-op imaging studies    Assessment:  73yM s/p right inguinal hernia repair with mesh    Plan:  - Monitor vitals  - Monitor post-op labs and replete as necessary  - Monitor for bowel function  - Continue pain medications as necessary  - Encourage ambulation as tolerated  - Monitor urine output  - DVT and GI Prophylaxis  - Monitor wound and dressing for changes, redress as needed.      Date/Time: 05-07-24 @ 23:19    [phone]

## 2024-05-07 NOTE — CHART NOTE - NSCHARTNOTEFT_GEN_A_CORE
PACU ANESTHESIA ADMISSION NOTE      Procedure: Right inguinal hernoia repair with mesh  Post op diagnosis:  right inguinal hernia    ____  Intubated  TV:______       Rate: ______      FiO2: ______    __x__  Patent Airway    ___x_  Full return of protective reflexes    __x__  Full recovery from anesthesia / back to baseline     Vitals:   T   98.2        R:   14            BP: 97/51              Sat:   100                 P: 100      Mental Status:  __x__ Awake   ___x__ Alert   _____ Drowsy   _____ Sedated    Nausea/Vomiting:  __x__ NO  ______Yes,   See Post - Op Orders          Pain Scale (0-10):  _____    Treatment: ____ None    __x__ See Post - Op/PCA Orders    Post - Operative Fluids:   ____ Oral   __x__ See Post - Op Orders    Plan: Discharge:   __x__Home       _____Floor     _____Critical Care    _____  Other:_________________    Pt transferred to PACU, pt remains hemodynamically stable, no acute distress intraop, pt tolerated procedure, report given to PACU RN.

## 2024-05-08 ENCOUNTER — TRANSCRIPTION ENCOUNTER (OUTPATIENT)
Age: 74
End: 2024-05-08

## 2024-05-08 VITALS
HEART RATE: 66 BPM | DIASTOLIC BLOOD PRESSURE: 60 MMHG | TEMPERATURE: 98 F | SYSTOLIC BLOOD PRESSURE: 128 MMHG | RESPIRATION RATE: 18 BRPM | OXYGEN SATURATION: 97 %

## 2024-05-08 LAB
GLUCOSE BLDC GLUCOMTR-MCNC: 111 MG/DL — HIGH (ref 70–99)
GLUCOSE BLDC GLUCOMTR-MCNC: 171 MG/DL — HIGH (ref 70–99)

## 2024-05-08 RX ORDER — OXYCODONE AND ACETAMINOPHEN 5; 325 MG/1; MG/1
1 TABLET ORAL
Qty: 12 | Refills: 0
Start: 2024-05-08 | End: 2024-05-10

## 2024-05-08 RX ORDER — ACETAMINOPHEN 500 MG
2 TABLET ORAL
Qty: 0 | Refills: 0 | DISCHARGE
Start: 2024-05-08

## 2024-05-08 RX ADMIN — Medication 2: at 11:46

## 2024-05-08 RX ADMIN — HEPARIN SODIUM 5000 UNIT(S): 5000 INJECTION INTRAVENOUS; SUBCUTANEOUS at 05:30

## 2024-05-08 RX ADMIN — AMLODIPINE BESYLATE 10 MILLIGRAM(S): 2.5 TABLET ORAL at 05:29

## 2024-05-08 RX ADMIN — Medication 2 MILLIGRAM(S): at 05:29

## 2024-05-08 RX ADMIN — CHLORHEXIDINE GLUCONATE 1 APPLICATION(S): 213 SOLUTION TOPICAL at 05:30

## 2024-05-08 RX ADMIN — Medication 400 UNIT(S): at 11:46

## 2024-05-08 RX ADMIN — PANTOPRAZOLE SODIUM 40 MILLIGRAM(S): 20 TABLET, DELAYED RELEASE ORAL at 08:23

## 2024-05-08 RX ADMIN — Medication 25 GRAM(S): at 00:57

## 2024-05-08 NOTE — DISCHARGE NOTE NURSING/CASE MANAGEMENT/SOCIAL WORK - NSDCPEFALRISK_GEN_ALL_CORE
For information on Fall & Injury Prevention, visit: [email or web address] OR  [email or web address] OR  https://www.cdc.gov/steadi/patient.html

## 2024-05-08 NOTE — DISCHARGE NOTE PROVIDER - NSDCMRMEDTOKEN_GEN_ALL_CORE_FT
acetaminophen 325 mg oral tablet: 2 tab(s) orally every 6 hours As needed Temp greater or equal to 38C (100.4F), Mild Pain (1 - 3)  amLODIPine 10 mg oral tablet: 1 orally once a day  aspirin 81 mg oral tablet: 1 tab(s) orally once a day  cholecalciferol 125 mcg (5000 intl units) oral capsule: 1 orally once a day  doxazosin 2 mg oral tablet: 1 orally once a day  Fish oil supplement daily:   omeprazole 40 mg oral delayed release capsule: 1 cap(s) orally once a day  pravastatin 20 mg oral tablet: 1 orally once a day

## 2024-05-08 NOTE — DISCHARGE NOTE PROVIDER - HOSPITAL COURSE
Patient is a 72 y/o male with PMHx of HTN, DEONNA not on CPAP, celiac disease, Afib s/p ablation, Left kidney cancer s/p robotic nephrectomy (1/2024), chronic Right inguinal hernia (followed by Dr. Frank outpatient) who presented to the ED c/o abdominal and right inguinal pain, associated with nausea x3 days. Patient stated he was previously able to reduce his right inguinal hernia but he has not been able to. Patient denies flatus/BM for several days. CT abd/pelvis on admission showed: Large right inguinal hernia containing the cecum, with evidence of bowel obstruction at the level of the cecum, with dilatation of the cecum in the hernia, and diffuse dilatation of all small bowel loops proximal to the terminal ileum.  Small ascites in the right inguinal hernia. Indeterminate right renal upper pole 1.4 cm hypodense lesion. Recommend renal sonogram for further evaluation.  Patient admitted to surgical service for further management. Patient was made NPO; started diet and advanced as tolerated. On admission right inguinal hernia reducible, but became nonreducible. Patient is now s/p right inguinal hernia repair with mesh on 5/7/24. Patient tolerated operation well and there were no post-operative complications identified. Patient remained hemodynamically stable in the PACU and transferred to the surgical floor.  POD 1 Patient remained HD stable, pain well-controlled. Patient had no other issues.     At this time, patient is currently ambulating, voiding, tolerating a regular diet. Pain well controlled on PO pain meds. Patient is hemodynamically stable and felt safe with being discharged. Patient understood and agreed with plan. Per Dr. Frank, patient is stable for discharge to home with outpatient follow up within 1-2 weeks of discharge.

## 2024-05-08 NOTE — PROGRESS NOTE ADULT - ASSESSMENT
A/P:  73yM with multiple comorbidities who presented with right reducible inguinal hernia. Right inguinal hernia was reduced at bedside, without acute complications.     PLAN:   -Ok to have CLD as tolerated, if patient becomes nauseas, transition to NPO if vomit may place NGT  -Monitor vitals  -Serial abdominal examination  -Monitor for bowel function  -mIVF  -Zofran PRN   -Pain control as needed  -Re-start home meds   -Heparin TID   -Ambulate as tolerated.     Blue [address]ra [id number]  
ASSESSMENT:  MIKE GORDON is a 73yM with multiple comorbidities who presented with right reducible inguinal hernia. Right inguinal hernia was reduced at bedside in the, without acute complications. Pt still has R inguinal hernia present now no longer reducible. He reports pain in his right groin w/ ambulation. Patient pending OR for hernia repair today.     PLAN:  - Diet: NPO for OR today  - pain control prn  - continue ASA  - continue protonix   - pre-op labs reviewed, pt repleted as necessary   - OOB/ambulate as tolerated  - DVT ppx: SQH  - dispo: pending OR today     Blue [id number]    
ASSESSMENT:  73yM s/p right inguinal hernia repair with mesh    PLAN:  - Hemodynamic monitoring  - Continue easy to chew diet, monitor tolerance  - Monitor for episodes of nausea and vomiting  - Monitor bowel function  - Multi-modal pain control  - Monitor labs, replete as needed  - Encourage ambulation as tolerated  - Discharge planning today    [phone]

## 2024-05-08 NOTE — DISCHARGE NOTE PROVIDER - NSDCFUSCHEDAPPT_GEN_ALL_CORE_FT
[facility]  [facility] [address] [facility]  Scheduled Appointment: 07/19/2024

## 2024-05-08 NOTE — DISCHARGE NOTE PROVIDER - NSDCCPCAREPLAN_GEN_ALL_CORE_FT
PRINCIPAL DISCHARGE DIAGNOSIS  Diagnosis: Large bowel obstruction  Assessment and Plan of Treatment:

## 2024-05-08 NOTE — PROGRESS NOTE ADULT - SUBJECTIVE AND OBJECTIVE BOX
[facility] Daily Progress Note  =====================================================    SUBJECTIVE: Patient seen and examined at bedside on AM rounds. No acute overnight events. Patient reports that they're feeling well. Reports some Flatus/ no BM. OOB/Amublating as tolerated. Reports some nausea and some "emesis" which was described as intermittent spit up. Denies fever, chills, SOB, chest pain.     PAST MEDICAL & SURGICAL HISTORY:  History of morbid obesity  DEONNA (obstructive sleep apnea)  AV block, Mobitz 1  RBBB  H/O: HTN (hypertension)  GERD (gastroesophageal reflux disease)  Aortic stenosis  Micky disease  Chronic atrial fibrillation  S/P cholecystectomy  S/P debridement        ALLERGIES:  No Known Allergies      --------------------------------------------------------------------------------------    MEDICATIONS:    Neurologic Medications  acetaminophen     Tablet .. 650 milliGRAM(s) Oral every 6 hours PRN Temp greater or equal to 38C (100.4F), Mild Pain (1 - 3)  ketorolac   Injectable 15 milliGRAM(s) IV Push every 8 hours PRN Moderate Pain (4 - 6)  ondansetron Injectable 4 milliGRAM(s) IV Push once    Respiratory Medications    Cardiovascular Medications  amLODIPine   Tablet 10 milliGRAM(s) Oral daily  doxazosin 2 milliGRAM(s) Oral daily    Gastrointestinal Medications  cholecalciferol 400 Unit(s) Oral daily  dextrose 10% Bolus 125 milliLiter(s) IV Bolus once  dextrose 5%. 1000 milliLiter(s) IV Continuous <Continuous>  dextrose 5%. 1000 milliLiter(s) IV Continuous <Continuous>  lactated ringers. 1000 milliLiter(s) IV Continuous <Continuous>  pantoprazole    Tablet 40 milliGRAM(s) Oral before breakfast    Genitourinary Medications    Hematologic/Oncologic Medications  aspirin  chewable 81 milliGRAM(s) Oral daily  heparin   Injectable 5000 Unit(s) SubCutaneous every 8 hours    Antimicrobial/Immunologic Medications    Endocrine/Metabolic Medications  dextrose 50% Injectable 25 Gram(s) IV Push once  dextrose 50% Injectable 12.5 Gram(s) IV Push once  dextrose Oral Gel 15 Gram(s) Oral once PRN Blood Glucose LESS THAN 70 milliGRAM(s)/deciliter  glucagon  Injectable 1 milliGRAM(s) IntraMuscular once  insulin lispro (ADMELOG) corrective regimen sliding scale   SubCutaneous three times a day before meals    Topical/Other Medications  chlorhexidine 2% Cloths 1 Application(s) Topical <User Schedule>    --------------------------------------------------------------------------------------    VITAL SIGNS:  T(C): 36.8 (05-07-24 @ 04:11), Max: 36.8 (05-06-24 @ 13:28)  HR: 64 (05-07-24 @ 04:11) (60 - 67)  BP: 124/66 (05-07-24 @ 04:11) (119/71 - 128/71)  RR: 18 (05-07-24 @ 04:11) (18 - 18)  SpO2: 96% (05-07-24 @ 04:11) (96% - 97%)  --------------------------------------------------------------------------------------    EXAM  General: NAD, resting in bed comfortably. A&Ox4.   Cardiac: S1, S2. pulse present   Respiratory: Nonlabored respirations, normal cw expansion. No signs of respiratory distress.   Abdomen: soft, nontender, nondistended. R inguinal hernia, nonreducible.   Extremities: no deformities, moving all extremities spontaneously.     --------------------------------------------------------------------------------------    LABS                          14.4   9.15  )-----------( 311      ( 06 May 2024 17:01 )             43.0     05-07    135  |  101  |  19  ----------------------------<  120<H>  4.4   |  26  |  1.3    Ca    9.1      07 May 2024 00:30  Phos  3.8     05-06  Mg     2.0     05-06        --------------------------------------------------------------------------------------      
GENERAL SURGERY PROGRESS NOTE     EVAN MIKE  [address] [place] [address] day :4d    POD:  Procedure:   Surgical Attending: Mahesh Frank  Overnight events: No acute events overnight. Pt reports tolerating a Diet, passing gas and having BM. He denies any nausea or vomiting. He also reports having pain at site of right inguinal hernia and reports wanting to proceed with repair of right inguinal hernia this admission if possible.     T(F): 97.6 (05-06-24 @ 04:16), Max: 98.1 (05-05-24 @ 12:00)  HR: 56 (05-06-24 @ 04:16) (56 - 82)  BP: 108/67 (05-06-24 @ 04:16) (108/67 - 139/65)  ABP: --  ABP(mean): --  RR: 18 (05-06-24 @ 04:16) (18 - 18)  SpO2: 97% (05-06-24 @ 04:16) (97% - 99%)    IN'S / OUT's:      PHYSICAL EXAM:  GENERAL: NAD   CHEST/LUNG: Equal chest rise b/l, no audible wheezing  HEART: Regular rate and rhythm  ABDOMEN: Soft, Nontender, Nondistended; R groin with non-reducible hernia tender to palpation  EXTREMITIES:  No clubbing, cyanosis, or edema      LABS  Labs:  CAPILLARY BLOOD GLUCOSE      POCT Blood Glucose.: 103 mg/dL (05 May 2024 17:16)  POCT Blood Glucose.: 115 mg/dL (05 May 2024 11:59)                          13.0   10.05 )-----------( 276      ( 05 May 2024 21:02 )             38.5         05-05    138  |  103  |  17  ----------------------------<  137<H>  4.4   |  25  |  1.2      Calcium: 9.0 mg/dL (05-05-24 @ 21:02)      LFTs:     Lactate, Blood: 1.0 mmol/L (05-03-24 @ 08:17)      Coags:            Urinalysis Basic - ( 05 May 2024 21:02 )    Color: x / Appearance: x / SG: x / pH: x  Gluc: 137 mg/dL / Ketone: x  / Bili: x / Urobili: x   Blood: x / Protein: x / Nitrite: x   Leuk Esterase: x / RBC: x / WBC x   Sq Epi: x / Non Sq Epi: x / Bacteria: x        Culture - Urine (collected 03 May 2024 17:30)  Source: Clean Catch Clean Catch (Midstream)  Final Report (04 May 2024 18:57):    No growth          RADIOLOGY & ADDITIONAL TESTS:      A/P:  MIKE GORDON is a 73yM with multiple comorbidities who presented with right reducible inguinal hernia. Right inguinal hernia was reduced at bedside in the, without acute complications. Pt still has R inguinal hernia present now no longer reducible. He reports pain in his right groin w/ ambulation.      PLAN:   - Possible Add on for OR 5/7 for Right inguinal hernia repair w/ mesh  - Keep Pt NPO, Continue w/ IVF  - will need Pre op labs 5/6 including CBC, BMP, Mg, Phos, coags, active type and screen  - multi modal pain control  - DVT and GI ppx  - encourage ambulation as tolerated       #DVT ppx: aspirin  chewable 81 milliGRAM(s) Oral daily  heparin   Injectable 5000 Unit(s) SubCutaneous every 8 hours    #GI ppx: pantoprazole    Tablet 40 milliGRAM(s) Oral before breakfast    Disposition:  4C    Above plan discussed with Attending Surgeon Dr. Frank  , patient, patient family, and rest of health care team    Nanali [phone]    
GENERAL SURGERY PROGRESS NOTE     JAELDESMOND MIKE  [address] [place] [address] day :2d    POD:  Procedure:   Surgical Attending: Mahesh Frank  Overnight events:    tolerating DASH diet, no n/v   passed gas. no BM   KUB done earlier that showed multiple dilated SB loops suggestive of ileus vs obstruction. abd is soft, non distended. pain report pain is very mild and controlled.       T(F): 96.6 (05-04-24 @ 20:44), Max: 98.7 (05-04-24 @ 07:24)  HR: 87 (05-04-24 @ 20:44) (63 - 87)  BP: 109/66 (05-04-24 @ 20:44) (109/66 - 122/74)  ABP: --  ABP(mean): --  RR: 18 (05-04-24 @ 20:44) (18 - 18)  SpO2: 97% (05-04-24 @ 20:44) (96% - 98%)    IN'S / OUT's:      PHYSICAL EXAM:  GENERAL: NAD, well-appearing  CHEST/LUNG: Clear to auscultation bilaterally  HEART: Regular rate and rhythm  ABDOMEN: Soft, mildly tender to palpation, Nondistended; hernia reducible. no rebound or guarding    EXTREMITIES:  No clubbing, cyanosis, or edema      LABS  Labs:  CAPILLARY BLOOD GLUCOSE      POCT Blood Glucose.: 173 mg/dL (04 May 2024 20:51)  POCT Blood Glucose.: 139 mg/dL (04 May 2024 16:28)  POCT Blood Glucose.: 120 mg/dL (04 May 2024 11:40)  POCT Blood Glucose.: 114 mg/dL (04 May 2024 08:12)                          13.4   9.60  )-----------( 258      ( 04 May 2024 20:00 )             38.5       Auto Neutrophil %: 72.9 % (05-04-24 @ 20:00)  Auto Immature Granulocyte %: 0.4 % (05-04-24 @ 20:00)  Auto Neutrophil %: 63.9 % (05-04-24 @ 04:30)  Auto Immature Granulocyte %: 0.3 % (05-04-24 @ 04:30)    05-04    141  |  103  |  16  ----------------------------<  168<H>  4.7   |  26  |  1.3      Calcium: 9.2 mg/dL (05-04-24 @ 20:00)      LFTs:             6.5  | 1.3  | 15       ------------------[107     ( 03 May 2024 08:17 )  4.3  | x    | 9           Lipase:14     Amylase:x         Lactate, Blood: 1.0 mmol/L (05-03-24 @ 08:17)      Coags:     12.90  ----< 1.13    ( 03 May 2024 20:58 )     32.6                Urinalysis Basic - ( 04 May 2024 20:00 )    Color: x / Appearance: x / SG: x / pH: x  Gluc: 168 mg/dL / Ketone: x  / Bili: x / Urobili: x   Blood: x / Protein: x / Nitrite: x   Leuk Esterase: x / RBC: x / WBC x   Sq Epi: x / Non Sq Epi: x / Bacteria: x        Culture - Urine (collected 03 May 2024 17:30)  Source: Clean Catch Clean Catch (Midstream)  Final Report (04 May 2024 18:57):    No growth          RADIOLOGY & ADDITIONAL TESTS:      A/P:  MIKE GORDON is a 73yM with multiple comorbidities who presented with right reducible inguinal hernia. Right inguinal hernia was reduced at bedside, without acute complications.     PLAN:   -Ok for DASH diet. if patient becomes nauseas, transition to NPO if vomit may place NGT  -Monitor vitals  -Serial abdominal examination- hernia is reducible. abd soft, mild;y tender ND    -Monitor for bowel function +g/-bm   -Zofran PRN   -Pain control as needed  -Re-start home meds   -Heparin TID   -Ambulate as tolerated   -FSG on ISS     Disposition:  ***    Above plan to be discussed with Attending Surgeon Dr. Dominic Wilkerson [phone]  
GENERAL SURGERY PROGRESS NOTE     EVAN MIKE  [address] [place] [address] day :1d    24 hour events:  +g/-bm  hernia reduced     PHYSICAL EXAM:  GENERAL: NAD, well-appearing  CHEST/LUNG: Clear to auscultation bilaterally  HEART: Regular rate and rhythm  ABDOMEN: Soft, Nontender, Nondistended;   EXTREMITIES:  No clubbing, cyanosis, or edema        T(F): 97.7 (05-04-24 @ 04:58), Max: 98.4 (05-03-24 @ 07:43)  HR: 65 (05-04-24 @ 04:58) (61 - 86)  BP: 122/62 (05-04-24 @ 04:58) (122/62 - 158/61)  ABP: --  ABP(mean): --  RR: 18 (05-04-24 @ 04:58) (18 - 19)  SpO2: 98% (05-04-24 @ 04:58) (98% - 98%)    IN'S / OUT's:      LABS  Labs:  CAPILLARY BLOOD GLUCOSE      POCT Blood Glucose.: 105 mg/dL (03 May 2024 22:48)  POCT Blood Glucose.: 115 mg/dL (03 May 2024 17:23)                          13.1   9.14  )-----------( 241      ( 03 May 2024 20:58 )             38.4       Auto Neutrophil %: 68.4 % (05-03-24 @ 20:58)  Auto Immature Granulocyte %: 0.3 % (05-03-24 @ 20:58)  Auto Neutrophil %: 87.8 % (05-03-24 @ 08:17)  Auto Immature Granulocyte %: 0.3 % (05-03-24 @ 08:17)    05-03    135  |  102  |  19  ----------------------------<  98  4.4   |  19  |  1.1      Calcium: 8.7 mg/dL (05-03-24 @ 20:58)      LFTs:             6.5  | 1.3  | 15       ------------------[107     ( 03 May 2024 08:17 )  4.3  | x    | 9           Lipase:14     Amylase:x         Lactate, Blood: 1.0 mmol/L (05-03-24 @ 08:17)      Coags:     12.90  ----< 1.13    ( 03 May 2024 20:58 )     32.6                Urinalysis Basic - ( 03 May 2024 20:58 )    Color: x / Appearance: x / SG: x / pH: x  Gluc: 98 mg/dL / Ketone: x  / Bili: x / Urobili: x   Blood: x / Protein: x / Nitrite: x   Leuk Esterase: x / RBC: x / WBC x   Sq Epi: x / Non Sq Epi: x / Bacteria: x            RADIOLOGY & ADDITIONAL TESTS:      
GENERAL SURGERY PROGRESS NOTE     MIKE GORDON  73y  Male  Hospital day :6d  POD:1d  Procedure: Repair, hernia, inguinal, open, using mesh, adult    OVERNIGHT EVENTS:  - Tolerating an easy to chew diet  - No nausea or vomiting  - No gas or bowel movements  - Ambulating and voiding appropriately  - Endorsed mild incisional pain    T(F): 97.1 (05-08-24 @ 00:01), Max: 98.4 (05-07-24 @ 09:42)  HR: 65 (05-08-24 @ 00:01) (64 - 100)  BP: 100/57 (05-08-24 @ 00:01) (91/53 - 129/93)  RR: 18 (05-08-24 @ 00:01) (10 - 20)  SpO2: 98% (05-08-24 @ 00:01) (95% - 100%)    DIET/FLUIDS: cholecalciferol 400 Unit(s) Oral daily  lactated ringers. 1000 milliLiter(s) IV Continuous <Continuous>    GI proph:  pantoprazole    Tablet 40 milliGRAM(s) Oral before breakfast    AC/ proph: heparin   Injectable 5000 Unit(s) SubCutaneous every 8 hours      PHYSICAL EXAM:  GENERAL: NAD  CHEST/LUNG: Clear to auscultation bilaterally  HEART: Regular rate and rhythm  ABDOMEN: Soft, Nontender, Nondistended; Right groin incision clean, dry, and intact      LABS  Labs:  CAPILLARY BLOOD GLUCOSE  POCT Blood Glucose.: 247 mg/dL (07 May 2024 21:00)  POCT Blood Glucose.: 159 mg/dL (07 May 2024 16:33)  POCT Blood Glucose.: 113 mg/dL (07 May 2024 08:15)  POCT Blood Glucose.: 132 mg/dL (07 May 2024 06:03)                          13.3   13.97 )-----------( 270      ( 07 May 2024 20:35 )             39.4         05-07    137  |  103  |  19  ----------------------------<  223<H>  4.9   |  21  |  1.3      Calcium: 8.8 mg/dL (05-07-24 @ 20:35)      LFTs:         Coags:     12.50  ----< 1.10    ( 06 May 2024 17:01 )     33.8                Urinalysis Basic - ( 07 May 2024 20:35 )    Color: x / Appearance: x / SG: x / pH: x  Gluc: 223 mg/dL / Ketone: x  / Bili: x / Urobili: x   Blood: x / Protein: x / Nitrite: x   Leuk Esterase: x / RBC: x / WBC x   Sq Epi: x / Non Sq Epi: x / Bacteria: x            RADIOLOGY & ADDITIONAL TESTS:  No post-op imaging.

## 2024-05-08 NOTE — DISCHARGE NOTE PROVIDER - NSDCCPTREATMENT_GEN_ALL_CORE_FT
PRINCIPAL PROCEDURE  Procedure: Repair, hernia, inguinal, open, using mesh, adult  Findings and Treatment: right sided

## 2024-05-08 NOTE — DISCHARGE NOTE NURSING/CASE MANAGEMENT/SOCIAL WORK - PATIENT PORTAL LINK FT
You can access the FollowMyHealth Patient Portal offered by [facility] by registering at the following website: [email or web address]. By joining [facility]’s FollowMyHealth portal, you will also be able to view your health information using other applications (apps) compatible with our system.

## 2024-05-08 NOTE — DISCHARGE NOTE PROVIDER - CARE PROVIDER_API CALL
Mahesh Frank  Surgery  [address] [place] [address], Floor [address] [place] [address]  Phone: [phone]  Fax: [phone]  Follow Up Time: 2 weeks

## 2024-05-08 NOTE — DISCHARGE NOTE PROVIDER - NSDCFUADDINST_GEN_ALL_CORE_FT
WOUND CARE:  Please keep incisions clean and dry. Please do not Scrub or rub incisions. Do not use lotion or powder on incisions.   BATHING: You may shower. You may use warm soapy water in the shower and rinse, pat dry. Do not bathe or go in the pool until your follow up appointment.   PAIN: You may take over-the-counter medications for pain. You make take Tylenol orally every 6 hours as needed. Do not excessed 4,000mg a day. You may take ibuprofen every 6 hours. You may alternate between the two for better pain control. You have been prescribed percocet (narcotic) for BREAKTHROUGH PAIN ONLY. You may take this every 6 hours AS NEEDED.   MEDICATIONS: Please continue home medications as prescribed. Please any new medications as prescribed on your pill bottle. If you have any questions, please call your surgeon's office.   ACTIVITY: No heavy lifting or straining. Otherwise, you may return to your usual level of physical activity.   DIET: Return to your usual diet.    NOTIFY YOUR SURGEON IF YOU HAVE: any bleeding that does not stop, any pus draining from your wound(s), any fever (over 100.4 F) persistent nausea/vomiting, inability to urinate, or if your pain is not controlled on your discharge pain medications.     FOLLOW UP:  1. Please follow up with your primary care physician in one week regarding your hospitalization, bring copies of your discharge paperwork. During your hospital stay, a incidental finding of a right renal lesion was found as discussed with you prior to discharge. Please follow up with your PCP for renal sonogram for further evaluation of lesion.  2. Please follow up with your surgeon, Dr. Frank 1-2 weeks after discharge. Please call the office to schedule the appointment or if you have any questions.

## 2024-05-15 DIAGNOSIS — Z90.49 ACQUIRED ABSENCE OF OTHER SPECIFIED PARTS OF DIGESTIVE TRACT: ICD-10-CM

## 2024-05-15 DIAGNOSIS — K21.9 GASTRO-ESOPHAGEAL REFLUX DISEASE WITHOUT ESOPHAGITIS: ICD-10-CM

## 2024-05-15 DIAGNOSIS — R18.8 OTHER ASCITES: ICD-10-CM

## 2024-05-15 DIAGNOSIS — I35.0 NONRHEUMATIC AORTIC (VALVE) STENOSIS: ICD-10-CM

## 2024-05-15 DIAGNOSIS — Z86.39 PERSONAL HISTORY OF OTHER ENDOCRINE, NUTRITIONAL AND METABOLIC DISEASE: ICD-10-CM

## 2024-05-15 DIAGNOSIS — I10 ESSENTIAL (PRIMARY) HYPERTENSION: ICD-10-CM

## 2024-05-15 DIAGNOSIS — Z87.891 PERSONAL HISTORY OF NICOTINE DEPENDENCE: ICD-10-CM

## 2024-05-15 DIAGNOSIS — Z90.5 ACQUIRED ABSENCE OF KIDNEY: ICD-10-CM

## 2024-05-15 DIAGNOSIS — I44.1 ATRIOVENTRICULAR BLOCK, SECOND DEGREE: ICD-10-CM

## 2024-05-15 DIAGNOSIS — I45.10 UNSPECIFIED RIGHT BUNDLE-BRANCH BLOCK: ICD-10-CM

## 2024-05-15 DIAGNOSIS — N28.9 DISORDER OF KIDNEY AND URETER, UNSPECIFIED: ICD-10-CM

## 2024-05-15 DIAGNOSIS — I48.20 CHRONIC ATRIAL FIBRILLATION, UNSPECIFIED: ICD-10-CM

## 2024-05-15 DIAGNOSIS — Z85.528 PERSONAL HISTORY OF OTHER MALIGNANT NEOPLASM OF KIDNEY: ICD-10-CM

## 2024-05-15 DIAGNOSIS — K90.0 CELIAC DISEASE: ICD-10-CM

## 2024-05-15 DIAGNOSIS — Z87.448 PERSONAL HISTORY OF OTHER DISEASES OF URINARY SYSTEM: ICD-10-CM

## 2024-05-15 DIAGNOSIS — G47.33 OBSTRUCTIVE SLEEP APNEA (ADULT) (PEDIATRIC): ICD-10-CM

## 2024-05-15 DIAGNOSIS — Z95.0 PRESENCE OF CARDIAC PACEMAKER: ICD-10-CM

## 2024-05-15 DIAGNOSIS — K40.31 UNILATERAL INGUINAL HERNIA, WITH OBSTRUCTION, WITHOUT GANGRENE, RECURRENT: ICD-10-CM

## 2024-05-15 DIAGNOSIS — E11.9 TYPE 2 DIABETES MELLITUS WITHOUT COMPLICATIONS: ICD-10-CM

## 2024-05-15 DIAGNOSIS — Z79.82 LONG TERM (CURRENT) USE OF ASPIRIN: ICD-10-CM

## 2024-05-24 ENCOUNTER — APPOINTMENT (OUTPATIENT)
Dept: SURGERY | Facility: CLINIC | Age: 74
End: 2024-05-24
Payer: MEDICARE

## 2024-05-24 VITALS
BODY MASS INDEX: 25.76 KG/M2 | HEIGHT: 71 IN | HEART RATE: 84 BPM | SYSTOLIC BLOOD PRESSURE: 130 MMHG | TEMPERATURE: 97.3 F | OXYGEN SATURATION: 97 % | WEIGHT: 184 LBS | DIASTOLIC BLOOD PRESSURE: 80 MMHG

## 2024-05-24 DIAGNOSIS — K40.90 UNILATERAL INGUINAL HERNIA, W/OUT OBSTRUCTION OR GANGRENE, NOT SPECIFIED AS RECURRENT: ICD-10-CM

## 2024-05-24 PROCEDURE — 99024 POSTOP FOLLOW-UP VISIT: CPT

## 2024-06-18 PROBLEM — K40.90 REDUCIBLE RIGHT INGUINAL HERNIA: Status: ACTIVE | Noted: 2023-06-22

## 2024-06-18 NOTE — HISTORY OF PRESENT ILLNESS
[de-identified] : 73-year-old male presents for follow-up after inguinal hernia repair.  Patient had been previously seen in the office for elective repair and subsequently underwent a pacemaker but did not follow-up afterwards.  Subsequently, presented to the emergency room with incarcerated inguinal hernia.  Now status post open repair.  Doing well.  Occasional pain but otherwise no acute complaints.  Tolerating diet well.

## 2024-06-18 NOTE — ASSESSMENT
[FreeTextEntry1] : 73-year-old male status post open inguinal hernia repair, doing well  Return to office as needed

## 2024-06-18 NOTE — PHYSICAL EXAM
[de-identified] : No acute distress [de-identified] : Soft, nontender, nondistended; incisions healing well

## 2024-07-19 ENCOUNTER — APPOINTMENT (OUTPATIENT)
Dept: ELECTROPHYSIOLOGY | Facility: CLINIC | Age: 74
End: 2024-07-19
Payer: MEDICARE

## 2024-07-19 VITALS
WEIGHT: 182.4 LBS | DIASTOLIC BLOOD PRESSURE: 71 MMHG | HEIGHT: 71 IN | RESPIRATION RATE: 17 BRPM | BODY MASS INDEX: 25.54 KG/M2 | SYSTOLIC BLOOD PRESSURE: 116 MMHG | HEART RATE: 67 BPM | TEMPERATURE: 97.6 F

## 2024-07-19 DIAGNOSIS — Z45.018 ENCOUNTER FOR ADJUSTMENT AND MANAGEMENT OF OTHER PART OF CARDIAC PACEMAKER: ICD-10-CM

## 2024-07-19 DIAGNOSIS — I10 ESSENTIAL (PRIMARY) HYPERTENSION: ICD-10-CM

## 2024-07-19 DIAGNOSIS — I44.2 ATRIOVENTRICULAR BLOCK, COMPLETE: ICD-10-CM

## 2024-07-19 PROCEDURE — 93280 PM DEVICE PROGR EVAL DUAL: CPT

## 2024-07-19 PROCEDURE — 99214 OFFICE O/P EST MOD 30 MIN: CPT | Mod: 25

## 2024-08-16 ENCOUNTER — APPOINTMENT (OUTPATIENT)
Dept: CARDIOLOGY | Facility: CLINIC | Age: 74
End: 2024-08-16
Payer: MEDICARE

## 2024-08-16 VITALS
DIASTOLIC BLOOD PRESSURE: 70 MMHG | WEIGHT: 182 LBS | SYSTOLIC BLOOD PRESSURE: 128 MMHG | BODY MASS INDEX: 25.48 KG/M2 | HEIGHT: 71 IN

## 2024-08-16 VITALS — HEART RATE: 59 BPM

## 2024-08-16 DIAGNOSIS — I35.0 NONRHEUMATIC AORTIC (VALVE) STENOSIS: ICD-10-CM

## 2024-08-16 DIAGNOSIS — I48.92 UNSPECIFIED ATRIAL FIBRILLATION: ICD-10-CM

## 2024-08-16 DIAGNOSIS — I48.91 UNSPECIFIED ATRIAL FIBRILLATION: ICD-10-CM

## 2024-08-16 PROCEDURE — 99214 OFFICE O/P EST MOD 30 MIN: CPT | Mod: 25

## 2024-08-16 PROCEDURE — 93000 ELECTROCARDIOGRAM COMPLETE: CPT

## 2024-08-16 RX ORDER — AMOXICILLIN 500 MG/1
CAPSULE ORAL
Refills: 0 | Status: ACTIVE | COMMUNITY

## 2024-08-17 NOTE — HISTORY OF PRESENT ILLNESS
[FreeTextEntry1] : 74-year-old male PMHx: AS, Afib/flutter, bradycardia/ CHB s/p PPPM, DM, GERD, HTN, NSVT, RBBB, DEONNA, kidney cancer > left nephrectomy  -The patient has had left nephrectomy. HE has not had chest pain or SOB. Has suspected RCC and was not given chemo et al. He has a PPM. States that he does feel better.  Patient presents for follow up visit. Denies CP, SOB, palpitations, dizziness, LE swelling. BP at home 120-150s/70-80s. Pt is able walk a few miles almost every day.   8/2/24: A1c 5.4 (6.2) GFR 64 Total Chol 130 Trig 49 HDL 64 LDL 56

## 2024-08-17 NOTE — CARDIOLOGY SUMMARY
[___] : [unfilled] [LVEF ___%] : LVEF [unfilled]% [None] : normal LV function [de-identified] : 6-1-2022 NSR with marked first degree AV block PAC RBBB  1- NSR first degree AV block RBBB LAFB  7-3-2023 Sinus Bradycardia First degree AV block RBBB  8-16-24: 59bpm V paced  2- NSR ventriuclar pacing .  [de-identified] : 8- Lexiscan stress test  Large inferolateral and infeoseptal  defect which is fixed  with normal LV systolic function  [de-identified] : 11- EF 55-60% mild MR mild TR   mild AS Ascending aorta was 3.8 cm   Normal LV systolic function EF 63% mild AS trace TR mild TR

## 2024-08-17 NOTE — CARDIOLOGY SUMMARY
[___] : [unfilled] [LVEF ___%] : LVEF [unfilled]% [None] : normal LV function [de-identified] : 6-1-2022 NSR with marked first degree AV block PAC RBBB  1- NSR first degree AV block RBBB LAFB  7-3-2023 Sinus Bradycardia First degree AV block RBBB  8-16-24: 59bpm V paced  2- NSR ventriuclar pacing .  [de-identified] : 8- Lexiscan stress test  Large inferolateral and infeoseptal  defect which is fixed  with normal LV systolic function  [de-identified] : 11- EF 55-60% mild MR mild TR   mild AS Ascending aorta was 3.8 cm   Normal LV systolic function EF 63% mild AS trace TR mild TR

## 2024-08-17 NOTE — ASSESSMENT
[FreeTextEntry1] : 74-year-old male PMHx: AS, Afib/flutter, bradycardia/ CHB s/p PPPM, DM, GERD, HTN, NSVT, RBBB, DEONNA, kidney cancer > left nephrectomy  -The patient has had a left nephrectomy for suspected RCC .  This was uncomplicated . He has not had chest pain or SOB . LDL is at goal .BP is stable and he has remained clinically in NSR .  He has mild AS . He had good exercise tolerance .   Plan: Continue current medication  Blood work  Follow up with EP for PPM interrogation Follow up in 6 months

## 2024-08-28 ENCOUNTER — APPOINTMENT (OUTPATIENT)
Dept: SPEECH THERAPY | Facility: CLINIC | Age: 74
End: 2024-08-28

## 2024-08-28 ENCOUNTER — OUTPATIENT (OUTPATIENT)
Dept: OUTPATIENT SERVICES | Facility: HOSPITAL | Age: 74
LOS: 1 days | End: 2024-08-28
Payer: MEDICARE

## 2024-08-28 DIAGNOSIS — Z98.890 OTHER SPECIFIED POSTPROCEDURAL STATES: Chronic | ICD-10-CM

## 2024-08-28 DIAGNOSIS — H90.3 SENSORINEURAL HEARING LOSS, BILATERAL: ICD-10-CM

## 2024-08-28 DIAGNOSIS — Z90.49 ACQUIRED ABSENCE OF OTHER SPECIFIED PARTS OF DIGESTIVE TRACT: Chronic | ICD-10-CM

## 2024-08-28 PROCEDURE — 92557 COMPREHENSIVE HEARING TEST: CPT

## 2024-08-28 PROCEDURE — 92550 TYMPANOMETRY & REFLEX THRESH: CPT

## 2024-10-18 ENCOUNTER — NON-APPOINTMENT (OUTPATIENT)
Age: 74
End: 2024-10-18

## 2024-10-18 ENCOUNTER — APPOINTMENT (OUTPATIENT)
Dept: CARDIOLOGY | Facility: CLINIC | Age: 74
End: 2024-10-18
Payer: MEDICARE

## 2024-10-18 PROCEDURE — 93296 REM INTERROG EVL PM/IDS: CPT

## 2024-10-18 PROCEDURE — 93294 REM INTERROG EVL PM/LDLS PM: CPT

## 2025-01-23 ENCOUNTER — NON-APPOINTMENT (OUTPATIENT)
Age: 75
End: 2025-01-23

## 2025-01-23 ENCOUNTER — APPOINTMENT (OUTPATIENT)
Dept: CARDIOLOGY | Facility: CLINIC | Age: 75
End: 2025-01-23

## 2025-01-23 PROCEDURE — 93296 REM INTERROG EVL PM/IDS: CPT

## 2025-01-23 PROCEDURE — 93294 REM INTERROG EVL PM/LDLS PM: CPT

## 2025-02-07 ENCOUNTER — APPOINTMENT (OUTPATIENT)
Dept: CARDIOLOGY | Facility: CLINIC | Age: 75
End: 2025-02-07
Payer: MEDICARE

## 2025-02-07 VITALS — SYSTOLIC BLOOD PRESSURE: 114 MMHG | HEART RATE: 62 BPM | DIASTOLIC BLOOD PRESSURE: 60 MMHG

## 2025-02-07 VITALS — WEIGHT: 206 LBS | BODY MASS INDEX: 28.84 KG/M2 | HEIGHT: 71 IN

## 2025-02-07 DIAGNOSIS — Z91.89 OTHER SPECIFIED PERSONAL RISK FACTORS, NOT ELSEWHERE CLASSIFIED: ICD-10-CM

## 2025-02-07 DIAGNOSIS — E11.9 TYPE 2 DIABETES MELLITUS W/OUT COMPLICATIONS: ICD-10-CM

## 2025-02-07 DIAGNOSIS — I48.91 UNSPECIFIED ATRIAL FIBRILLATION: ICD-10-CM

## 2025-02-07 DIAGNOSIS — R00.1 BRADYCARDIA, UNSPECIFIED: ICD-10-CM

## 2025-02-07 DIAGNOSIS — E87.5 HYPERKALEMIA: ICD-10-CM

## 2025-02-07 DIAGNOSIS — I48.92 UNSPECIFIED ATRIAL FIBRILLATION: ICD-10-CM

## 2025-02-07 DIAGNOSIS — K21.9 GASTRO-ESOPHAGEAL REFLUX DISEASE W/OUT ESOPHAGITIS: ICD-10-CM

## 2025-02-07 DIAGNOSIS — I44.0 ATRIOVENTRICULAR BLOCK, FIRST DEGREE: ICD-10-CM

## 2025-02-07 PROCEDURE — 99214 OFFICE O/P EST MOD 30 MIN: CPT | Mod: 25

## 2025-02-07 PROCEDURE — 93000 ELECTROCARDIOGRAM COMPLETE: CPT

## 2025-02-13 LAB
ALBUMIN SERPL ELPH-MCNC: 4.5 G/DL
ALP BLD-CCNC: 121 U/L
ALT SERPL-CCNC: 10 U/L
ANION GAP SERPL CALC-SCNC: 11 MMOL/L
AST SERPL-CCNC: 14 U/L
BASOPHILS # BLD AUTO: 0.06 K/UL
BASOPHILS NFR BLD AUTO: 0.6 %
BILIRUB SERPL-MCNC: 0.9 MG/DL
BUN SERPL-MCNC: 31 MG/DL
CALCIUM SERPL-MCNC: 9.3 MG/DL
CHLORIDE SERPL-SCNC: 103 MMOL/L
CHOLEST SERPL-MCNC: 132 MG/DL
CO2 SERPL-SCNC: 25 MMOL/L
CREAT SERPL-MCNC: 1.2 MG/DL
EGFR: 64 ML/MIN/1.73M2
EOSINOPHIL # BLD AUTO: 0.63 K/UL
EOSINOPHIL NFR BLD AUTO: 6.4 %
ESTIMATED AVERAGE GLUCOSE: 108 MG/DL
GLUCOSE SERPL-MCNC: 110 MG/DL
HBA1C MFR BLD HPLC: 5.4 %
HCT VFR BLD CALC: 46 %
HDLC SERPL-MCNC: 70 MG/DL
HGB BLD-MCNC: 15.1 G/DL
IMM GRANULOCYTES NFR BLD AUTO: 0.3 %
LDLC SERPL CALC-MCNC: 45 MG/DL
LYMPHOCYTES # BLD AUTO: 2.59 K/UL
LYMPHOCYTES NFR BLD AUTO: 26.1 %
MAN DIFF?: NORMAL
MCHC RBC-ENTMCNC: 31.1 PG
MCHC RBC-ENTMCNC: 32.8 G/DL
MCV RBC AUTO: 94.8 FL
MONOCYTES # BLD AUTO: 0.61 K/UL
MONOCYTES NFR BLD AUTO: 6.2 %
NEUTROPHILS # BLD AUTO: 5.99 K/UL
NEUTROPHILS NFR BLD AUTO: 60.4 %
NONHDLC SERPL-MCNC: 62 MG/DL
PLATELET # BLD AUTO: 235 K/UL
PMV BLD AUTO: 0 /100 WBCS
PMV BLD: 10.9 FL
POTASSIUM SERPL-SCNC: 5 MMOL/L
PROT SERPL-MCNC: 6.6 G/DL
RBC # BLD: 4.85 M/UL
RBC # FLD: 13 %
SODIUM SERPL-SCNC: 139 MMOL/L
TRIGL SERPL-MCNC: 90 MG/DL
WBC # FLD AUTO: 9.91 K/UL

## 2025-04-25 ENCOUNTER — APPOINTMENT (OUTPATIENT)
Dept: ELECTROPHYSIOLOGY | Facility: CLINIC | Age: 75
End: 2025-04-25

## 2025-07-18 ENCOUNTER — APPOINTMENT (OUTPATIENT)
Dept: CARDIOLOGY | Facility: CLINIC | Age: 75
End: 2025-07-18
Payer: MEDICARE

## 2025-07-18 ENCOUNTER — NON-APPOINTMENT (OUTPATIENT)
Age: 75
End: 2025-07-18

## 2025-07-18 PROCEDURE — 93296 REM INTERROG EVL PM/IDS: CPT

## 2025-07-18 PROCEDURE — 93294 REM INTERROG EVL PM/LDLS PM: CPT

## 2025-09-01 LAB
ALBUMIN SERPL ELPH-MCNC: 4.7 G/DL
ALP BLD-CCNC: 114 U/L
ALT SERPL-CCNC: 9 U/L
ANION GAP SERPL CALC-SCNC: 15 MMOL/L
AST SERPL-CCNC: 14 U/L
BASOPHILS # BLD AUTO: 0.07 K/UL
BASOPHILS NFR BLD AUTO: 0.7 %
BILIRUB SERPL-MCNC: 1.2 MG/DL
BUN SERPL-MCNC: 18 MG/DL
CALCIUM SERPL-MCNC: 9.7 MG/DL
CHLORIDE SERPL-SCNC: 104 MMOL/L
CHOLEST SERPL-MCNC: 128 MG/DL
CO2 SERPL-SCNC: 22 MMOL/L
CREAT SERPL-MCNC: 1.4 MG/DL
EGFRCR SERPLBLD CKD-EPI 2021: 53 ML/MIN/1.73M2
EOSINOPHIL # BLD AUTO: 0.55 K/UL
EOSINOPHIL NFR BLD AUTO: 5.7 %
ESTIMATED AVERAGE GLUCOSE: 123 MG/DL
GLUCOSE SERPL-MCNC: 109 MG/DL
HBA1C MFR BLD HPLC: 5.9 %
HCT VFR BLD CALC: 44.6 %
HDLC SERPL-MCNC: 64 MG/DL
HGB BLD-MCNC: 14.9 G/DL
IMM GRANULOCYTES NFR BLD AUTO: 0.2 %
LDLC SERPL-MCNC: 50 MG/DL
LYMPHOCYTES # BLD AUTO: 2.31 K/UL
LYMPHOCYTES NFR BLD AUTO: 24.1 %
MAN DIFF?: NORMAL
MCHC RBC-ENTMCNC: 31.1 PG
MCHC RBC-ENTMCNC: 33.4 G/DL
MCV RBC AUTO: 93.1 FL
MONOCYTES # BLD AUTO: 0.46 K/UL
MONOCYTES NFR BLD AUTO: 4.8 %
NEUTROPHILS # BLD AUTO: 6.16 K/UL
NEUTROPHILS NFR BLD AUTO: 64.5 %
NONHDLC SERPL-MCNC: 64 MG/DL
PLATELET # BLD AUTO: 250 K/UL
PMV BLD AUTO: 0 /100 WBCS
PMV BLD: 10.6 FL
POTASSIUM SERPL-SCNC: 4.7 MMOL/L
PROT SERPL-MCNC: 6.9 G/DL
RBC # BLD: 4.79 M/UL
RBC # FLD: 13.3 %
SODIUM SERPL-SCNC: 141 MMOL/L
TRIGL SERPL-MCNC: 71 MG/DL
WBC # FLD AUTO: 9.57 K/UL

## 2025-09-09 ENCOUNTER — NON-APPOINTMENT (OUTPATIENT)
Age: 75
End: 2025-09-09

## 2025-09-10 ENCOUNTER — NON-APPOINTMENT (OUTPATIENT)
Age: 75
End: 2025-09-10

## 2025-09-10 ENCOUNTER — APPOINTMENT (OUTPATIENT)
Dept: CARDIOLOGY | Facility: CLINIC | Age: 75
End: 2025-09-10
Payer: MEDICARE

## 2025-09-10 VITALS — DIASTOLIC BLOOD PRESSURE: 80 MMHG | SYSTOLIC BLOOD PRESSURE: 118 MMHG | HEART RATE: 80 BPM

## 2025-09-10 VITALS — BODY MASS INDEX: 31.08 KG/M2 | WEIGHT: 222 LBS | HEIGHT: 71 IN

## 2025-09-10 DIAGNOSIS — Z95.0 PRESENCE OF CARDIAC PACEMAKER: ICD-10-CM

## 2025-09-10 DIAGNOSIS — I48.91 UNSPECIFIED ATRIAL FIBRILLATION: ICD-10-CM

## 2025-09-10 DIAGNOSIS — K21.9 GASTRO-ESOPHAGEAL REFLUX DISEASE W/OUT ESOPHAGITIS: ICD-10-CM

## 2025-09-10 DIAGNOSIS — I45.10 UNSPECIFIED RIGHT BUNDLE-BRANCH BLOCK: ICD-10-CM

## 2025-09-10 DIAGNOSIS — Z91.89 OTHER SPECIFIED PERSONAL RISK FACTORS, NOT ELSEWHERE CLASSIFIED: ICD-10-CM

## 2025-09-10 DIAGNOSIS — I44.1 ATRIOVENTRICULAR BLOCK, SECOND DEGREE: ICD-10-CM

## 2025-09-10 DIAGNOSIS — I44.2 ATRIOVENTRICULAR BLOCK, COMPLETE: ICD-10-CM

## 2025-09-10 DIAGNOSIS — R00.1 BRADYCARDIA, UNSPECIFIED: ICD-10-CM

## 2025-09-10 DIAGNOSIS — Z45.018 ENCOUNTER FOR ADJUSTMENT AND MANAGEMENT OF OTHER PART OF CARDIAC PACEMAKER: ICD-10-CM

## 2025-09-10 DIAGNOSIS — I35.0 NONRHEUMATIC AORTIC (VALVE) STENOSIS: ICD-10-CM

## 2025-09-10 DIAGNOSIS — E11.9 TYPE 2 DIABETES MELLITUS W/OUT COMPLICATIONS: ICD-10-CM

## 2025-09-10 PROCEDURE — 93000 ELECTROCARDIOGRAM COMPLETE: CPT

## 2025-09-10 PROCEDURE — 99214 OFFICE O/P EST MOD 30 MIN: CPT | Mod: 25
